# Patient Record
Sex: MALE | Race: BLACK OR AFRICAN AMERICAN | NOT HISPANIC OR LATINO | Employment: OTHER | URBAN - METROPOLITAN AREA
[De-identification: names, ages, dates, MRNs, and addresses within clinical notes are randomized per-mention and may not be internally consistent; named-entity substitution may affect disease eponyms.]

---

## 2017-04-24 ENCOUNTER — OFFICE VISIT (OUTPATIENT)
Dept: OPHTHALMOLOGY | Facility: CLINIC | Age: 77
End: 2017-04-24
Payer: MEDICARE

## 2017-04-24 DIAGNOSIS — H40.043 STEROID RESPONDER, BILATERAL: ICD-10-CM

## 2017-04-24 DIAGNOSIS — H57.03 SMALL PUPIL: ICD-10-CM

## 2017-04-24 DIAGNOSIS — H40.1133 PRIMARY OPEN ANGLE GLAUCOMA OF BOTH EYES, SEVERE STAGE: Primary | ICD-10-CM

## 2017-04-24 DIAGNOSIS — H01.9 EYELID INFLAMMATION: ICD-10-CM

## 2017-04-24 DIAGNOSIS — Z96.1 PSEUDOPHAKIA OF RIGHT EYE: ICD-10-CM

## 2017-04-24 DIAGNOSIS — H25.12 NUCLEAR SCLEROSIS, LEFT: ICD-10-CM

## 2017-04-24 PROCEDURE — 99999 PR PBB SHADOW E&M-EST. PATIENT-LVL III: CPT | Mod: PBBFAC,,, | Performed by: OPHTHALMOLOGY

## 2017-04-24 PROCEDURE — 92012 INTRM OPH EXAM EST PATIENT: CPT | Mod: S$PBB,,, | Performed by: OPHTHALMOLOGY

## 2017-04-24 PROCEDURE — 99213 OFFICE O/P EST LOW 20 MIN: CPT | Mod: PBBFAC | Performed by: OPHTHALMOLOGY

## 2017-04-24 RX ORDER — TIMOLOL MALEATE 5 MG/ML
1 SOLUTION/ DROPS OPHTHALMIC 2 TIMES DAILY
Qty: 5 ML | Refills: 12 | Status: SHIPPED | OUTPATIENT
Start: 2017-04-24 | End: 2018-03-20 | Stop reason: SDUPTHER

## 2017-04-24 RX ORDER — BRINZOLAMIDE 10 MG/ML
1 SUSPENSION/ DROPS OPHTHALMIC 3 TIMES DAILY
Qty: 10 ML | Refills: 12 | Status: SHIPPED | OUTPATIENT
Start: 2017-04-24 | End: 2018-03-20 | Stop reason: SDUPTHER

## 2017-04-24 RX ORDER — LATANOPROST 50 UG/ML
1 SOLUTION/ DROPS OPHTHALMIC NIGHTLY
Qty: 2.5 ML | Refills: 12 | Status: SHIPPED | OUTPATIENT
Start: 2017-04-24 | End: 2018-03-20 | Stop reason: SDUPTHER

## 2017-04-24 NOTE — PROGRESS NOTES
HPI     Excessive Tearing    Additional comments: Pt her for excessive tearing of both eyes           Comments   DLS: 12/20/2016    C/o: pt states his eyes are tearing a lot since last exam.  Pt here for 4   month iop ck     Meds:  Azopt tid ou             Timolol bid ou              Xalatan qhs ou          Last edited by Kate Guillen MA on 4/24/2017  9:33 AM. (History)            Assessment /Plan     For exam results, see Encounter Report.    There are no diagnoses linked to this encounter.  HPI     Excessive Tearing    Additional comments: Pt her for excessive tearing of both eyes           Comments   DLS: 12/20/2016    C/o: pt states his eyes are tearing a lot since last exam.  Pt here for 4   month iop ck     Meds:  Azopt tid ou             Timolol bid ou              Xalatan qhs ou          Last edited by Kate Guillen MA on 4/24/2017  9:33 AM. (History)            Assessment /Plan     For exam results, see Encounter Report.    There are no diagnoses linked to this encounter.  Pt states he has been seeing Dr. Joyce and that the IOP is ok  He also C/O poor vision OD (has a dense NS // but also endstage glaucoma)  He also C/O epiphora os - s/p probing and irrigation by Isiah - but still persisit     1. POAG (primary open-angle glaucoma) -Advanced   Treated for glaucoma elsewhere x many years  Pt referred in by Dr. Jyoce - ?? Needs trab os to get off some gtts  S/p Barveldt ([place]) OD 2011  Prev SLT OD(?)   First HVF  - [facility] 2013   First photos   [facility] - 2013   Treatment / Drops started   Many years ago - elsewhwere           Family history    ?        Glaucoma meds     Timolol ou , latanoprost ou , brimonidine os, dorzolamide os        H/O adverse rxn to glaucoma drops    C/O irritation os with brimonidine and dorzolamide and alphagan P        LASERS    ?        GLAUCOMA SURGERIES    Baerveldt od - still phakic / [place] - Dr. Mahdi Patrick 2/2011         OTHER EYE SURGERIES    Complex phaco/IOL od  3/18/2015         CDR    0.9 w/ inf notch /0.8        Tbase    ??          Tmax    ??            Ttarget    15/15            HVF    1 outside test 6/15/2012 - Dr. Joyce                   2 [facility] test 2013 - 2014 - SALT / IAD od // early SAD os                   2 2015 OD 24-2 stim V dense SAD/IAD // OS 24-2 ss SAD/IAD        Gonio    +3 S/T/N, PAS I od // +3 S/T/N , PAS I os        CCT    512/494        OCT   2 test 2012 to 2016 - RNFL - OD:dec S/I // OS:dec S/I        HRT    4 test 2013 to 2016 - -MR -  Bord inf but unreliable // bord. S/T os /// CDR 0.49 od // 0.69 os        Disc photos    2013, 2016  - OIS    - Ttoday    16/18  - Test done today IOP check        2. 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  Nuclear sclerosis OU - monitor  - check AR/MR/BAT  BVCA 8/2013 - 20/50 and 20/25  BATh 8/2013 - 20/400 and 20/50    5. ? APD od - re-check          Plan  POAG OD >> OS  S/P glaucoma tube OD  - [facility] - Dr. Mahdi Patrick - 2011  Now has insurance again and is seeing Dr. Joyce  Pt has stopped alphagan P - per Dr. Joyce - he was C/O eye irritation and tearing    - If unable to tolerate the gtts or if IOP too high even with gtts   - rec - some sort of glaucoma filtration surgery and possible cataract surgery at same time OD  - if IOP goes up od - consider a 2nd GDD in the IN quadrant     -intolerant to alphagan P    Glaucoma gtts   Timolol ou bid   azopt tid ou  latanoprsot q hs ou    - ?? APD od - difficult to tell - may have a reverse APD   - ?? Ever had SLT or if angle open enough - angles open S/N/I, too narrow inf     Blepharitits -- C/O OF Continued TEARING. OS>OD // this is a chronic - ongoing issue    WC/LH/EES oint and AT's --> reinforced lid hygiene  BID    RE-START ERYTHROMYCIN OINTMENT TO EYE LIDS QHS/PRN --> pt states he is not using this --> educated to re-start     F/u with HVF - 24-2 stim V od and  24-2 ss os // DFE // OCT - 4 months   -if + prog or if IOP continues to creep up re-discuss surgical options   ?? If ever tried teresita or if could tolerate it

## 2017-08-25 ENCOUNTER — OFFICE VISIT (OUTPATIENT)
Dept: OPHTHALMOLOGY | Facility: CLINIC | Age: 77
End: 2017-08-25
Payer: MEDICARE

## 2017-08-25 ENCOUNTER — CLINICAL SUPPORT (OUTPATIENT)
Dept: OPHTHALMOLOGY | Facility: CLINIC | Age: 77
End: 2017-08-25
Payer: MEDICARE

## 2017-08-25 DIAGNOSIS — H25.12 NUCLEAR SCLEROSIS, LEFT: ICD-10-CM

## 2017-08-25 DIAGNOSIS — H40.1133 PRIMARY OPEN ANGLE GLAUCOMA OF BOTH EYES, SEVERE STAGE: ICD-10-CM

## 2017-08-25 DIAGNOSIS — Z96.1 PSEUDOPHAKIA OF RIGHT EYE: ICD-10-CM

## 2017-08-25 DIAGNOSIS — H40.043 STEROID RESPONDER, BILATERAL: ICD-10-CM

## 2017-08-25 DIAGNOSIS — H40.1133 PRIMARY OPEN ANGLE GLAUCOMA OF BOTH EYES, SEVERE STAGE: Primary | ICD-10-CM

## 2017-08-25 DIAGNOSIS — H57.03 SMALL PUPIL: ICD-10-CM

## 2017-08-25 PROCEDURE — 92133 CPTRZD OPH DX IMG PST SGM ON: CPT | Mod: PBBFAC | Performed by: OPHTHALMOLOGY

## 2017-08-25 PROCEDURE — 99212 OFFICE O/P EST SF 10 MIN: CPT | Mod: PBBFAC | Performed by: OPHTHALMOLOGY

## 2017-08-25 PROCEDURE — 92012 INTRM OPH EXAM EST PATIENT: CPT | Mod: S$PBB,,, | Performed by: OPHTHALMOLOGY

## 2017-08-25 PROCEDURE — 99999 PR PBB SHADOW E&M-EST. PATIENT-LVL II: CPT | Mod: PBBFAC,,, | Performed by: OPHTHALMOLOGY

## 2017-08-25 PROCEDURE — 92083 EXTENDED VISUAL FIELD XM: CPT | Mod: 26,S$PBB,, | Performed by: OPHTHALMOLOGY

## 2017-08-25 NOTE — Clinical Note
Complex phaco/IOL / GDD - ? baerveldt OS - trypan blue / Yessica ramirez  Phone - home - [phone] ? Cell - [phone]

## 2017-08-25 NOTE — PROGRESS NOTES
HPI     DLS: 4/24/17    Pt here for HVF review;  Pt states he may need refills on all of his drops. Pt states the OS runs   water a lot. Pt didn't want to be dilated today.     Meds: Azopt tid ou             Timolol bid ou             Xalatan qhs ou    1. POAG (primary open angle glaucoma), advanced  2. Blepharitis   3. Posterior synechiae, right eye  4. Nuclear sclerosis, both eyes      Last edited by Asia Wu on 8/25/2017 10:15 AM. (History)            Assessment /Plan     For exam results, see Encounter Report.    Primary open angle glaucoma of both eyes, severe stage  -     Posterior Segment OCT Optic Nerve- Both eyes    Small pupil    Steroid responder, bilateral    Glaucoma shunt device of right eye    Pseudophakia of right eye    Nuclear sclerosis, left      HPI     Excessive Tearing    Additional comments: Pt her for excessive tearing of both eyes           Comments   DLS: 12/20/2016    C/o: pt states his eyes are tearing a lot since last exam.  Pt here for 4   month iop ck     Meds:  Azopt tid ou             Timolol bid ou              Xalatan qhs ou          Last edited by Kate Guillen MA on 4/24/2017  9:33 AM. (History)            Assessment /Plan     For exam results, see Encounter Report.    There are no diagnoses linked to this encounter.  HPI     Excessive Tearing    Additional comments: Pt her for excessive tearing of both eyes           Comments   DLS: 12/20/2016    C/o: pt states his eyes are tearing a lot since last exam.  Pt here for 4   month iop ck     Meds:  Azopt tid ou             Timolol bid ou              Xalatan qhs ou          Last edited by Kate Guillen MA on 4/24/2017  9:33 AM. (History)            Assessment /Plan     For exam results, see Encounter Report.    There are no diagnoses linked to this encounter.  Pt states he has been seeing Dr. Joyce and that the IOP is ok  He also C/O poor vision OD (has a dense NS // but also endstage glaucoma)  He also C/O epiphora os - s/p  probing and irrigation by [facility]iah - but still persisit     1. POAG (primary open-angle glaucoma) -Advanced   Treated for glaucoma elsewhere x many years  Pt referred in by Dr. Joyce - ?? Needs trab os to get off some gtts  S/p Barveldt ([place]) OD 2011  Prev SLT OD(?)   [facility]   [facility]   [facility] - [facility]   Treatment / Drops started   Many years ago - elsewhwere           Family history    ?        Glaucoma meds     Timolol ou , latanoprost ou , brimonidine os, dorzolamide os        H/O adverse rxn to glaucoma drops    C/O irritation os with brimonidine and dorzolamide and alphagan P        LASERS    ?        GLAUCOMA SURGERIES    Baerveldt od - still phakic / [place] - Dr. Mahdi Patrick 2/2011         OTHER EYE SURGERIES    Complex phaco/IOL od 3/18/2015         CDR    0.9 w/ inf notch /0.8        Tbase    ??          Tmax    ??            Ttarget    15/15            HVF    1 outside test 6/15/2012 - Dr. Joyce                   2 [facility] test 2013 - 2014 - SALT / IAD od // early SAD os                   3 2017 OD 24-2 stim V dense SALT/IAD // OS 24-2 ss SAD/IAD        Gonio    +3 S/T/N, PAS I od // +3 S/T/N , PAS I os        CCT    512/494        OCT   3 test 2012 to 2017 - RNFL - OD:dec S/I/N // OS:dec S/I/N        HRT    4 test 2013 to 2016 - -MR -  Bord inf but unreliable // bord. S/T os /// CDR 0.49 od // 0.69 os        Disc photos    2013, 2016  - OIS    - Ttoday    16/18  - Test done today HVF / OCT       2. 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  Nuclear sclerosis OU - monitor  - check AR/MR/BAT  BVCA 8/2013 - 20/50 and 20/25  BATh 8/2013 - 20/400 and 20/50    5. ? APD od - re-check          Plan  POAG OD >> OS  S/P glaucoma tube OD  - baerveldt - [place] - Dr. Mahdi Patrick - 2011  Now has insurance again and is seeing Dr. Joyce  Pt has stopped alphagan P - per Dr. Joyce - he was C/O eye  irritation and tearing    - If unable to tolerate the gtts or if IOP too high even with gtts   - rec - some sort of glaucoma filtration surgery and possible cataract surgery at same time OS  -IOP borderline - and ++ VF progression OS      - if IOP goes up od - consider a 2nd GDD in the IN quadrant      -intolerant to alphagan P    Glaucoma gtts   Timolol ou bid   azopt tid ou  latanoprsot q hs ou    - ?? APD od - difficult to tell - may have a reverse APD   - too narrow for SLT os - may open post phacol     Blepharitits -- C/O OF Continued TEARING. OS>OD // this is a chronic - ongoing issue    WC/LH/EES oint and AT's --> reinforced lid hygiene  BID    RE-START ERYTHROMYCIN OINTMENT TO EYE LIDS QHS/PRN --> pt states he is not using this --> educated to re-start     PLAN  ++ VF prog os   NS worsening - + cortical changes   rec complex - phaco/IOL // + trypan blue and ? Yessica ring - had IFIS in other eye // and GDD - ?? ahmed vs Baerveldt - may do better long term with Baerveldt

## 2017-08-29 ENCOUNTER — TELEPHONE (OUTPATIENT)
Dept: OPHTHALMOLOGY | Facility: CLINIC | Age: 77
End: 2017-08-29

## 2017-09-14 ENCOUNTER — TELEPHONE (OUTPATIENT)
Dept: OPHTHALMOLOGY | Facility: CLINIC | Age: 77
End: 2017-09-14

## 2017-09-14 DIAGNOSIS — H40.1133 PRIMARY OPEN ANGLE GLAUCOMA OF BOTH EYES, SEVERE STAGE: ICD-10-CM

## 2017-09-14 DIAGNOSIS — H25.12 NUCLEAR SCLEROTIC CATARACT OF LEFT EYE: Primary | ICD-10-CM

## 2017-09-29 ENCOUNTER — OFFICE VISIT (OUTPATIENT)
Dept: OPHTHALMOLOGY | Facility: CLINIC | Age: 77
End: 2017-09-29
Payer: MEDICARE

## 2017-09-29 DIAGNOSIS — Z96.1 PSEUDOPHAKIA OF RIGHT EYE: ICD-10-CM

## 2017-09-29 DIAGNOSIS — H57.03 SMALL PUPIL: ICD-10-CM

## 2017-09-29 DIAGNOSIS — H25.12 NUCLEAR SCLEROTIC CATARACT OF LEFT EYE: Primary | ICD-10-CM

## 2017-09-29 DIAGNOSIS — H40.1123 PRIMARY OPEN ANGLE GLAUCOMA OF LEFT EYE, SEVERE STAGE: ICD-10-CM

## 2017-09-29 DIAGNOSIS — H40.1133 PRIMARY OPEN ANGLE GLAUCOMA OF BOTH EYES, SEVERE STAGE: ICD-10-CM

## 2017-09-29 DIAGNOSIS — H40.043 STEROID RESPONDER, BILATERAL: ICD-10-CM

## 2017-09-29 PROCEDURE — 99212 OFFICE O/P EST SF 10 MIN: CPT | Mod: PBBFAC | Performed by: OPHTHALMOLOGY

## 2017-09-29 PROCEDURE — 92136 OPHTHALMIC BIOMETRY: CPT | Mod: PBBFAC,LT | Performed by: OPHTHALMOLOGY

## 2017-09-29 PROCEDURE — 99999 PR PBB SHADOW E&M-EST. PATIENT-LVL II: CPT | Mod: PBBFAC,,, | Performed by: OPHTHALMOLOGY

## 2017-09-29 PROCEDURE — 99499 UNLISTED E&M SERVICE: CPT | Mod: S$PBB,,, | Performed by: OPHTHALMOLOGY

## 2017-09-29 RX ORDER — TROPICAMIDE 10 MG/ML
1 SOLUTION/ DROPS OPHTHALMIC
Status: CANCELLED | OUTPATIENT
Start: 2017-09-29

## 2017-09-29 RX ORDER — SODIUM CHLORIDE 9 MG/ML
INJECTION, SOLUTION INTRAVENOUS CONTINUOUS
Status: CANCELLED | OUTPATIENT
Start: 2017-09-29

## 2017-09-29 RX ORDER — MOXIFLOXACIN 5 MG/ML
1 SOLUTION/ DROPS OPHTHALMIC
Status: CANCELLED | OUTPATIENT
Start: 2017-09-29

## 2017-09-29 RX ORDER — LIDOCAINE HYDROCHLORIDE 10 MG/ML
1 INJECTION, SOLUTION EPIDURAL; INFILTRATION; INTRACAUDAL; PERINEURAL ONCE
Status: CANCELLED | OUTPATIENT
Start: 2017-09-29 | End: 2017-09-29

## 2017-09-29 RX ORDER — PHENYLEPHRINE HYDROCHLORIDE 100 MG/ML
1 SOLUTION/ DROPS OPHTHALMIC
Status: CANCELLED | OUTPATIENT
Start: 2017-09-29

## 2017-09-29 RX ORDER — KETOROLAC TROMETHAMINE 5 MG/ML
1 SOLUTION OPHTHALMIC
Status: CANCELLED | OUTPATIENT
Start: 2017-09-29

## 2017-09-29 NOTE — H&P
Subjective:       Patient ID: Ulysses R Jones Jr. is a 77 y.o. male.    Chief Complaint: Pre-op Exam    HPI  Review of Systems   Constitutional: Negative.    HENT: Negative.    Eyes: Positive for visual disturbance.   Respiratory: Negative.    Cardiovascular: Negative.    Gastrointestinal: Negative.    Endocrine: Negative.    Genitourinary: Negative.        Objective:      Physical Exam   Constitutional: He is oriented to person, place, and time. He appears well-nourished.   HENT:   Head: Normocephalic and atraumatic.   Cardiovascular: Normal rate.    Pulmonary/Chest: Effort normal.   Neurological: He is alert and oriented to person, place, and time.   Skin: Skin is warm and dry.   Psychiatric: He has a normal mood and affect.       Assessment:       1. Nuclear sclerotic cataract of left eye    2. Primary open angle glaucoma of both eyes, severe stage    3. Small pupil    4. Steroid responder, bilateral    5. Glaucoma shunt device of right eye    6. Pseudophakia of right eye        Plan:       Plan for complex phaco/Trypan/Malyugin ring with combined Baerveldt in the left eye 10/11/2017

## 2017-09-29 NOTE — PROGRESS NOTES
HPI     DLS: 8/25/17    Pt here for Pre-Op phaco w/IOL/GDD- OS (Surgery is 10/11/17)    Meds: Azopt tid ou             Timolol bid ou              Xalatan qhs ou    1. Primary open angle glaucoma of both eyes, severe stage  2. Small pupil  3. Steroid responder, bilateral  4. Glaucoma shunt device of right eye  5. Pseudophakia, right eye  6. Nuclear sclerosis, left eye     Last edited by Asia Wu on 9/29/2017  1:11 PM. (History)            Assessment /Plan     For exam results, see Encounter Report.    Nuclear sclerotic cataract of left eye  -     IOL Master - MOD 26 - OS - Left eye  -     Place in Outpatient; Standing  -     Vital signs, per unit routine ; Standing  -     Activity as tolerated; Standing  -     Void on arrival ; Standing  -     Diet NPO; Standing    Primary open angle glaucoma of left eye, severe stage    Small pupil    Primary open angle glaucoma of both eyes, severe stage  -     Place in Outpatient; Standing  -     Vital signs, per unit routine ; Standing  -     Activity as tolerated; Standing  -     Void on arrival ; Standing  -     Diet NPO; Standing    Steroid responder, bilateral    Glaucoma shunt device of right eye    Pseudophakia of right eye    Other orders  -     lidocaine (PF) 10 mg/ml (1%) injection 10 mg; Inject 1 mL (10 mg total) into the skin once.  -     0.9%  NaCl infusion; Inject into the vein continuous.  -     ketorolac 0.5% ophthalmic solution 1 drop; Place 1 drop into the left eye On call Procedure (surgery).  -     moxifloxacin 0.5 % ophthalmic solution 1 drop; Place 1 drop into the left eye On call Procedure (surgery).  -     phenylephrine HCL 10% ophthalmic solution 1 drop; Place 1 drop into the left eye On call Procedure for Irritation (surgery).  -     tropicamide 1% ophthalmic solution 1 drop; Place 1 drop into the left eye On call Procedure (surgery).      1. Nuclear sclerotic cataract of left eye    2. Primary open angle glaucoma of left eye, severe stage    3.  Small pupil    4. Primary open angle glaucoma of both eyes, severe stage    5. Steroid responder, bilateral    6. Glaucoma shunt device of right eye    7. Pseudophakia of right eye          Old pt of  Dr. Joyce ok  C/O epiphora os - s/p probing and irrigation by [facility] - but still persisit     1. POAG (primary open-angle glaucoma) -Advanced   Treated for glaucoma elsewhere x many years  Pt referred in by Dr. Joyce - ?? Needs trab os to get off some gtts  S/p Barveldt ([place]) OD 2011  Prev SLT OD(?)   [facility]   [facility]   [facility] - [facility]   Treatment / Drops started   Many years ago - elsewhwere           Family history    ?        Glaucoma meds     Timolol ou , latanoprost ou , brimonidine os, dorzolamide os        H/O adverse rxn to glaucoma drops    C/O irritation os with brimonidine and dorzolamide and alphagan P        LASERS    ?        GLAUCOMA SURGERIES    Baerveldt od - still phakic / [place] - Dr. Mahdi Patrick 2/2011         OTHER EYE SURGERIES    Complex phaco/IOL od 3/18/2015         CDR    0.9 w/ inf notch /0.8        Tbase    ??          Tmax    ??            Ttarget    15/15            HVF    1 outside test 6/15/2012 - Dr. Joyce                   2 [facility] test 2013 - 2014 - SALT / IAD od // early SAD os                   3 2017 OD 24-2 stim V dense SALT/IAD // OS 24-2 ss SAD/IAD        Gonio    +3 S/T/N, PAS I od // +3 S/T/N , PAS I os        CCT    512/494        OCT   3 test 2012 to 2017 - RNFL - OD:dec S/I/N // OS:dec S/I/N        HRT    4 test 2013 to 2016 - -MR -  Bord inf but unreliable // bord. S/T os /// CDR 0.49 od // 0.69 os        Disc photos    2013, 2016  - OIS    - Ttoday    16/18  - Test done today pre-op phaco / IOL / baerveldt OS - small pupil - needs general anesthesia       2. 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  Nuclear sclerosis OU - monitor  - check AR/MR/BAT  BVCA  8/2013 - 20/50 and 20/25  BATh 8/2013 - 20/400 and 20/50    5. ? APD od - re-check          Plan  POAG OD >> OS  S/P glaucoma tube OD  - baerveldt - [place] - Dr. Mahdi Patrick - 2011  Now has insurance again and is seeing Dr. Joyce  Pt has stopped alphagan P - per Dr. Joyce - he was C/O eye irritation and tearing    Pt C/O blurry vision os   rec phaco/IOL os with small pupil // Yessica ring // and Baerveldt   + will need general again   -IOP borderline - and ++ VF progression OS      - if IOP goes up OD  - consider a 2nd GDD in the IN quadrant      -intolerant to alphagan P    Glaucoma gtts   Timolol ou bid   azopt tid ou  latanoprost q hs ou    - ?? APD od - difficult to tell - may have a reverse APD   - too narrow for SLT os - may open post phacol     Blepharitits -- C/O OF Continued TEARING. OS>OD // this is a chronic - ongoing issue    WC/LH/EES oint and AT's --> reinforced lid hygiene  BID    RE-START ERYTHROMYCIN OINTMENT TO EYE LIDS QHS/PRN --> pt states he is not using this --> educated to re-start       rec complex - phaco/IOL // + trypan blue and ? Yessica ring - had IFIS in other eye // and GDD - ?? ahmed vs Baerveldt - may do better long term with Baerveldt - GENERAL ANESTHESIA AGAIN -  10/11/17    IOL CALC OS  SN60WF 21.50 D  ACIOL 18.0 D    Risks and benefits discussed and consent signed.

## 2017-10-11 ENCOUNTER — ANESTHESIA EVENT (OUTPATIENT)
Dept: SURGERY | Facility: HOSPITAL | Age: 77
End: 2017-10-11
Payer: MEDICARE

## 2017-10-11 ENCOUNTER — HOSPITAL ENCOUNTER (OUTPATIENT)
Facility: HOSPITAL | Age: 77
Discharge: HOME | End: 2017-10-11
Attending: OPHTHALMOLOGY | Admitting: OPHTHALMOLOGY
Payer: MEDICARE

## 2017-10-11 ENCOUNTER — ANESTHESIA (OUTPATIENT)
Dept: SURGERY | Facility: HOSPITAL | Age: 77
End: 2017-10-11
Payer: MEDICARE

## 2017-10-11 VITALS
RESPIRATION RATE: 16 BRPM | WEIGHT: 160 LBS | HEART RATE: 67 BPM | DIASTOLIC BLOOD PRESSURE: 72 MMHG | BODY MASS INDEX: 25.71 KG/M2 | TEMPERATURE: 98 F | SYSTOLIC BLOOD PRESSURE: 122 MMHG | OXYGEN SATURATION: 100 % | HEIGHT: 66 IN

## 2017-10-11 DIAGNOSIS — H25.12 NUCLEAR SCLEROTIC CATARACT OF LEFT EYE: Primary | ICD-10-CM

## 2017-10-11 DIAGNOSIS — H40.1133 PRIMARY OPEN ANGLE GLAUCOMA OF BOTH EYES, SEVERE STAGE: ICD-10-CM

## 2017-10-11 LAB — POCT GLUCOSE: 115 MG/DL (ref 70–110)

## 2017-10-11 PROCEDURE — 63600175 PHARM REV CODE 636 W HCPCS: Performed by: OPHTHALMOLOGY

## 2017-10-11 PROCEDURE — 25000003 PHARM REV CODE 250: Performed by: NURSE ANESTHETIST, CERTIFIED REGISTERED

## 2017-10-11 PROCEDURE — D9220A PRA ANESTHESIA: Mod: ANES,,, | Performed by: ANESTHESIOLOGY

## 2017-10-11 PROCEDURE — C9447 INJ, PHENYLEPHRINE KETOROLAC: HCPCS | Performed by: OPHTHALMOLOGY

## 2017-10-11 PROCEDURE — D9220A PRA ANESTHESIA: Mod: CRNA,,, | Performed by: NURSE ANESTHETIST, CERTIFIED REGISTERED

## 2017-10-11 PROCEDURE — V2632 POST CHMBR INTRAOCULAR LENS: HCPCS | Performed by: OPHTHALMOLOGY

## 2017-10-11 PROCEDURE — 36000706: Performed by: OPHTHALMOLOGY

## 2017-10-11 PROCEDURE — 27800903 OPTIME MED/SURG SUP & DEVICES OTHER IMPLANTS: Performed by: OPHTHALMOLOGY

## 2017-10-11 PROCEDURE — 37000008 HC ANESTHESIA 1ST 15 MINUTES: Performed by: OPHTHALMOLOGY

## 2017-10-11 PROCEDURE — 25000003 PHARM REV CODE 250: Performed by: OPHTHALMOLOGY

## 2017-10-11 PROCEDURE — 71000015 HC POSTOP RECOV 1ST HR: Performed by: OPHTHALMOLOGY

## 2017-10-11 PROCEDURE — 82962 GLUCOSE BLOOD TEST: CPT | Performed by: OPHTHALMOLOGY

## 2017-10-11 PROCEDURE — 27200651 HC AIRWAY, LMA: Performed by: NURSE ANESTHETIST, CERTIFIED REGISTERED

## 2017-10-11 PROCEDURE — 66984 XCAPSL CTRC RMVL W/O ECP: CPT | Mod: 51,LT,, | Performed by: OPHTHALMOLOGY

## 2017-10-11 PROCEDURE — 36000707: Performed by: OPHTHALMOLOGY

## 2017-10-11 PROCEDURE — 71000033 HC RECOVERY, INTIAL HOUR: Performed by: OPHTHALMOLOGY

## 2017-10-11 PROCEDURE — 66183 INSERT ANT DRAINAGE DEVICE: CPT | Mod: LT,,, | Performed by: OPHTHALMOLOGY

## 2017-10-11 PROCEDURE — 63600175 PHARM REV CODE 636 W HCPCS: Performed by: NURSE ANESTHETIST, CERTIFIED REGISTERED

## 2017-10-11 PROCEDURE — 71000016 HC POSTOP RECOV ADDL HR: Performed by: OPHTHALMOLOGY

## 2017-10-11 PROCEDURE — C1783 OCULAR IMP, AQUEOUS DRAIN DE: HCPCS | Performed by: OPHTHALMOLOGY

## 2017-10-11 PROCEDURE — 37000009 HC ANESTHESIA EA ADD 15 MINS: Performed by: OPHTHALMOLOGY

## 2017-10-11 DEVICE — LENS 21.5 ACRYSOF: Type: IMPLANTABLE DEVICE | Site: EYE | Status: FUNCTIONAL

## 2017-10-11 DEVICE — PERICARDIUM TUTOPLAST 1.5X1.5: Type: IMPLANTABLE DEVICE | Site: EYE | Status: FUNCTIONAL

## 2017-10-11 DEVICE — IMPLANT BEARVELDT GLAUCOMA 250: Type: IMPLANTABLE DEVICE | Site: EYE | Status: FUNCTIONAL

## 2017-10-11 RX ORDER — ACETAZOLAMIDE 500 MG/1
500 CAPSULE, EXTENDED RELEASE ORAL ONCE
Status: COMPLETED | OUTPATIENT
Start: 2017-10-11 | End: 2017-10-11

## 2017-10-11 RX ORDER — LIDOCAINE HYDROCHLORIDE 20 MG/ML
JELLY TOPICAL
Status: DISCONTINUED
Start: 2017-10-11 | End: 2017-10-11 | Stop reason: WASHOUT

## 2017-10-11 RX ORDER — ATROPINE SULFATE 10 MG/ML
SOLUTION/ DROPS OPHTHALMIC
Status: DISCONTINUED
Start: 2017-10-11 | End: 2017-10-11 | Stop reason: HOSPADM

## 2017-10-11 RX ORDER — FENTANYL CITRATE 50 UG/ML
INJECTION, SOLUTION INTRAMUSCULAR; INTRAVENOUS
Status: DISCONTINUED | OUTPATIENT
Start: 2017-10-11 | End: 2017-10-11

## 2017-10-11 RX ORDER — LIDOCAINE HYDROCHLORIDE 40 MG/ML
INJECTION, SOLUTION RETROBULBAR
Status: DISCONTINUED | OUTPATIENT
Start: 2017-10-11 | End: 2017-10-11 | Stop reason: HOSPADM

## 2017-10-11 RX ORDER — HYDROMORPHONE HYDROCHLORIDE 1 MG/ML
0.2 INJECTION, SOLUTION INTRAMUSCULAR; INTRAVENOUS; SUBCUTANEOUS EVERY 5 MIN PRN
Status: DISCONTINUED | OUTPATIENT
Start: 2017-10-11 | End: 2017-10-11 | Stop reason: HOSPADM

## 2017-10-11 RX ORDER — PREDNISOLONE ACETATE 10 MG/ML
SUSPENSION/ DROPS OPHTHALMIC
Status: DISCONTINUED | OUTPATIENT
Start: 2017-10-11 | End: 2017-10-11 | Stop reason: HOSPADM

## 2017-10-11 RX ORDER — ACETAMINOPHEN 325 MG/1
650 TABLET ORAL EVERY 6 HOURS PRN
Status: DISCONTINUED | OUTPATIENT
Start: 2017-10-11 | End: 2017-10-11 | Stop reason: HOSPADM

## 2017-10-11 RX ORDER — LIDOCAINE HYDROCHLORIDE 10 MG/ML
INJECTION, SOLUTION EPIDURAL; INFILTRATION; INTRACAUDAL; PERINEURAL
Status: DISCONTINUED | OUTPATIENT
Start: 2017-10-11 | End: 2017-10-11 | Stop reason: HOSPADM

## 2017-10-11 RX ORDER — PROPOFOL 10 MG/ML
INJECTION, EMULSION INTRAVENOUS
Status: DISCONTINUED | OUTPATIENT
Start: 2017-10-11 | End: 2017-10-11

## 2017-10-11 RX ORDER — ACETAZOLAMIDE 500 MG/1
CAPSULE, EXTENDED RELEASE ORAL
Status: DISCONTINUED
Start: 2017-10-11 | End: 2017-10-11 | Stop reason: HOSPADM

## 2017-10-11 RX ORDER — TROPICAMIDE 10 MG/ML
1 SOLUTION/ DROPS OPHTHALMIC
Status: DISCONTINUED | OUTPATIENT
Start: 2017-10-11 | End: 2017-10-11 | Stop reason: HOSPADM

## 2017-10-11 RX ORDER — PHENYLEPHRINE HYDROCHLORIDE 100 MG/ML
1 SOLUTION/ DROPS OPHTHALMIC
Status: DISCONTINUED | OUTPATIENT
Start: 2017-10-11 | End: 2017-10-11 | Stop reason: HOSPADM

## 2017-10-11 RX ORDER — MOXIFLOXACIN 5 MG/ML
SOLUTION/ DROPS OPHTHALMIC
Status: DISCONTINUED | OUTPATIENT
Start: 2017-10-11 | End: 2017-10-11 | Stop reason: HOSPADM

## 2017-10-11 RX ORDER — LIDOCAINE HYDROCHLORIDE 10 MG/ML
INJECTION, SOLUTION EPIDURAL; INFILTRATION; INTRACAUDAL; PERINEURAL
Status: DISCONTINUED
Start: 2017-10-11 | End: 2017-10-11 | Stop reason: HOSPADM

## 2017-10-11 RX ORDER — MOXIFLOXACIN 5 MG/ML
1 SOLUTION/ DROPS OPHTHALMIC
Status: DISCONTINUED | OUTPATIENT
Start: 2017-10-11 | End: 2017-10-11 | Stop reason: HOSPADM

## 2017-10-11 RX ORDER — PHENYLEPHRINE HYDROCHLORIDE 10 MG/ML
INJECTION INTRAVENOUS
Status: DISCONTINUED | OUTPATIENT
Start: 2017-10-11 | End: 2017-10-11

## 2017-10-11 RX ORDER — GENTAMICIN SULFATE 40 MG/ML
INJECTION, SOLUTION INTRAMUSCULAR; INTRAVENOUS
Status: DISCONTINUED
Start: 2017-10-11 | End: 2017-10-11 | Stop reason: HOSPADM

## 2017-10-11 RX ORDER — SODIUM CHLORIDE 9 MG/ML
INJECTION, SOLUTION INTRAVENOUS CONTINUOUS
Status: DISCONTINUED | OUTPATIENT
Start: 2017-10-11 | End: 2017-10-11 | Stop reason: HOSPADM

## 2017-10-11 RX ORDER — LIDOCAINE HYDROCHLORIDE 40 MG/ML
INJECTION, SOLUTION RETROBULBAR
Status: DISCONTINUED
Start: 2017-10-11 | End: 2017-10-11 | Stop reason: HOSPADM

## 2017-10-11 RX ORDER — LIDOCAINE HYDROCHLORIDE 10 MG/ML
1 INJECTION, SOLUTION EPIDURAL; INFILTRATION; INTRACAUDAL; PERINEURAL ONCE
Status: COMPLETED | OUTPATIENT
Start: 2017-10-11 | End: 2017-10-11

## 2017-10-11 RX ORDER — PREDNISOLONE ACETATE 10 MG/ML
SUSPENSION/ DROPS OPHTHALMIC
Status: DISCONTINUED
Start: 2017-10-11 | End: 2017-10-11 | Stop reason: HOSPADM

## 2017-10-11 RX ORDER — LIDOCAINE HCL/PF 100 MG/5ML
SYRINGE (ML) INTRAVENOUS
Status: DISCONTINUED | OUTPATIENT
Start: 2017-10-11 | End: 2017-10-11

## 2017-10-11 RX ORDER — ONDANSETRON 2 MG/ML
INJECTION INTRAMUSCULAR; INTRAVENOUS
Status: DISCONTINUED | OUTPATIENT
Start: 2017-10-11 | End: 2017-10-11

## 2017-10-11 RX ORDER — KETOROLAC TROMETHAMINE 5 MG/ML
1 SOLUTION OPHTHALMIC
Status: DISCONTINUED | OUTPATIENT
Start: 2017-10-11 | End: 2017-10-11 | Stop reason: HOSPADM

## 2017-10-11 RX ORDER — MIDAZOLAM HYDROCHLORIDE 1 MG/ML
INJECTION, SOLUTION INTRAMUSCULAR; INTRAVENOUS
Status: DISCONTINUED | OUTPATIENT
Start: 2017-10-11 | End: 2017-10-11

## 2017-10-11 RX ORDER — ACETAMINOPHEN 500 MG
1000 TABLET ORAL ONCE
Status: DISCONTINUED | OUTPATIENT
Start: 2017-10-11 | End: 2017-10-11 | Stop reason: HOSPADM

## 2017-10-11 RX ORDER — SODIUM CHLORIDE 0.9 % (FLUSH) 0.9 %
3 SYRINGE (ML) INJECTION
Status: DISCONTINUED | OUTPATIENT
Start: 2017-10-11 | End: 2017-10-11 | Stop reason: HOSPADM

## 2017-10-11 RX ADMIN — EPHEDRINE SULFATE 10 MG: 50 INJECTION, SOLUTION INTRAMUSCULAR; INTRAVENOUS; SUBCUTANEOUS at 11:10

## 2017-10-11 RX ADMIN — MIDAZOLAM HYDROCHLORIDE 2 MG: 1 INJECTION, SOLUTION INTRAMUSCULAR; INTRAVENOUS at 11:10

## 2017-10-11 RX ADMIN — KETOROLAC TROMETHAMINE 1 DROP: 5 SOLUTION/ DROPS OPHTHALMIC at 09:10

## 2017-10-11 RX ADMIN — EPHEDRINE SULFATE 10 MG: 50 INJECTION, SOLUTION INTRAMUSCULAR; INTRAVENOUS; SUBCUTANEOUS at 12:10

## 2017-10-11 RX ADMIN — ACETAMINOPHEN 650 MG: 325 TABLET ORAL at 03:10

## 2017-10-11 RX ADMIN — TROPICAMIDE 1 DROP: 10 SOLUTION/ DROPS OPHTHALMIC at 09:10

## 2017-10-11 RX ADMIN — FENTANYL CITRATE 50 MCG: 50 INJECTION, SOLUTION INTRAMUSCULAR; INTRAVENOUS at 01:10

## 2017-10-11 RX ADMIN — FENTANYL CITRATE 25 MCG: 50 INJECTION, SOLUTION INTRAMUSCULAR; INTRAVENOUS at 12:10

## 2017-10-11 RX ADMIN — ONDANSETRON 4 MG: 2 INJECTION INTRAMUSCULAR; INTRAVENOUS at 12:10

## 2017-10-11 RX ADMIN — MOXIFLOXACIN HYDROCHLORIDE 1 DROP: 5 SOLUTION/ DROPS OPHTHALMIC at 09:10

## 2017-10-11 RX ADMIN — SODIUM CHLORIDE: 0.9 INJECTION, SOLUTION INTRAVENOUS at 09:10

## 2017-10-11 RX ADMIN — PHENYLEPHRINE HYDROCHLORIDE 1 DROP: 100 SOLUTION/ DROPS OPHTHALMIC at 09:10

## 2017-10-11 RX ADMIN — ACETAZOLAMIDE 500 MG: 500 CAPSULE, EXTENDED RELEASE ORAL at 02:10

## 2017-10-11 RX ADMIN — LIDOCAINE HYDROCHLORIDE 1 MG: 10 INJECTION, SOLUTION EPIDURAL; INFILTRATION; INTRACAUDAL; PERINEURAL at 09:10

## 2017-10-11 RX ADMIN — FENTANYL CITRATE 25 MCG: 50 INJECTION, SOLUTION INTRAMUSCULAR; INTRAVENOUS at 11:10

## 2017-10-11 RX ADMIN — PHENYLEPHRINE HYDROCHLORIDE 100 MCG: 10 INJECTION INTRAVENOUS at 11:10

## 2017-10-11 RX ADMIN — PROPOFOL 150 MG: 10 INJECTION, EMULSION INTRAVENOUS at 11:10

## 2017-10-11 RX ADMIN — PHENYLEPHRINE HYDROCHLORIDE 200 MCG: 10 INJECTION INTRAVENOUS at 11:10

## 2017-10-11 RX ADMIN — LIDOCAINE HYDROCHLORIDE 100 MG: 20 INJECTION, SOLUTION INTRAVENOUS at 11:10

## 2017-10-11 NOTE — ANESTHESIA PREPROCEDURE EVALUATION
10/11/2017  Ulysses R Jones Jr. is a 77 y.o., male.    Pre-op Assessment    I have reviewed the Patient Summary Reports.     I have reviewed the Nursing Notes.      Review of Systems  Anesthesia Hx:  No problems with previous Anesthesia    Social:  Non-Smoker, No Alcohol Use    Cardiovascular:   Exercise tolerance: good Hypertension Denies MI.  Denies CAD.    Denies CABG/stent.  Denies Dysrhythmias.   Denies Angina.        Pulmonary:   Denies COPD.  Denies Asthma. Recent URI  Denies Sleep Apnea.    Renal/:   Denies Chronic Renal Disease.     Hepatic/GI:   Denies GERD.    Musculoskeletal:   Arthritis     Neurological:   Denies TIA. Denies CVA. Denies Seizures.    Endocrine:   Denies Diabetes. Denies Hypothyroidism. Denies Hyperthyroidism.        Physical Exam  General:  Well nourished    Airway/Jaw/Neck:  Airway Findings: Mouth Opening: Normal Tongue: Normal  General Airway Assessment: Adult  Mallampati: II  Improves to II with phonation.  TM Distance: 4 - 6 cm     Eyes/Ears/Nose:  Eyes/Ears/Nose Findings:    Dental:  Dental Findings: In tact   Chest/Lungs:  Chest/Lungs Findings: Clear to auscultation     Heart/Vascular:  Heart Findings: Rate: Normal        Mental Status:  Mental Status Findings:  Cooperative, Alert and Oriented         Anesthesia Plan  Type of Anesthesia, risks & benefits discussed:  Anesthesia Type:  general  Patient's Preference: mac  Intra-op Monitoring Plan: standard ASA monitors  Intra-op Monitoring Plan Comments:   Post Op Pain Control Plan: multimodal analgesia, IV/PO Opiods PRN and per primary service following discharge from PACU  Post Op Pain Control Plan Comments:   Induction:   IV  Beta Blocker:  Patient is not currently on a Beta-Blocker (No further documentation required).       Informed Consent: Patient understands risks and agrees with Anesthesia plan.  Questions answered.  Anesthesia consent signed with patient.  ASA Score: 2     Day of Surgery Review of History & Physical:    H&P update referred to the surgeon.     Anesthesia Plan Notes: The patient's PMH was reviewed and PE was performed  Plan for GA        Ready For Surgery From Anesthesia Perspective.     Mr. Ma states that he has had a productive cough for last few days that has improved with antibiotics.  Spoke to surgeon. Plan for GA.

## 2017-10-11 NOTE — OP NOTE
DATE OF PROCEDURE: 10/11/2017    PREOPERATIVE DIAGNOSIS: 1. Cataract// Nuclear sclerotic cataract of left eye OS.        2. Primary open angle Glaucoma - left eye severe    POSTOPERATIVE DIAGNOSIS: Cataract// Nuclear sclerotic cataract of left eyeOS, status post procedure.       2. Primary open angle Glaucoma - left eye severe     PROCEDURE PERFORMED: Cataract extraction with phacoemulsification, posterior   chamber intraocular lens placement.      2. Baerveldt glaucoma implant  - left eye     SURGEON: Promise Franklin M.D.     ASSISTANT: Shawn nation MD      COMPLICATIONS: None.     BLOOD LOSS: Less than 5 mL.     ANESTHESIA: General    IMPLANT DATA:   1. Pietro model SN60WF, power 21.5  diopters, serial #[id number]    PROCEDURE IN DETAIL: After informed consent including risks, benefits,   alternatives, the patient was brought to the operating room table, placed in   supine position. General  anesthesia care was used throughout the entire   procedure. Once adequate anesthesia was confirmed, the patient was then prepped   and draped in usual sterile fashion for intraocular surgery. Wire speculum was   used to hold the eyelids apart and the procedure was initiated by making   a partial thickness corneal wound with a kendy blade temporally.   A supersharp blade was then used to make a second entry into the eye via a paracentesis incision.  Intracameral lidocaine followed by Viscoat were placed in the anterior chamber.   A 2.4 mm blade was then used to make to complete the clear corneal   incision temporally. A cystotome was used to make a tear in   the anterior capsular flap, which was continued around with Utrata forceps for   continuous curvilinear capsulorrhexis. BSS on a Gallagher cannula was then used for   hydrodissection and hydrodelineation of lens. The lens was noted to spin   freely in the bag. Phacoemulsification handpiece was then used to remove the   lens in a divide-and-conquer manner. Irrigation  aspiration handpiece removed   the remaining cortical material. Provisc was placed in the eye followed by the   lens as mentioned above without any complications. Once the lens was in proper   position, irrigation aspiration handpiece was used to remove the remaining Provisc. The   wounds were then hydrated with BSS and noted to be watertight.A 10-0 nylon was placed in the corneal incision.    Next the microscope was re-positioned and the surgeon moved to the head of the bed.   The glaucoma drainage device surgery was initiated .  A baerveldt implant - -177 was used    A superotemporal fornix-based conjunctival peritomy was performed with Vancasper and Pam scissor dissection.  The  implant was inspected and the stent was tied off in a watertight fashion using a  7-0 vicryl suture and tested with 30-gauge cannula on a BSS syringe and demonstrated to be watertight.  A sub-Tenons pocket was formed, the rectus muscles were identified on successive throws with muscle hooks and the shunt implant was then placed in the sug-Tenons space without difficulty.  The implant was fixed to the globe 9 to 10 mm posterior to the limbus using 7-0 Vicryl sutures.  The tubing was then cut to size and the eye was first entered at the paracentesis site and then the eye was reentered to form a stent tract site using a 23-gauge butterfly needle.  The shunt tubing  was placed in the anterior chamber in good position through this tract without difficulty.  The silicone stent was fixated to the globe using interrupted Vicryl suture and a pericardial patch graft was cut to shape, fitting  well over the entrance site and tubing length and fixed to the globe with 8-0 Vicryl suture.  The conjunctiva was then secured to the limbus in a watertight fashion using 8-0 Vicryl sutures.  The anterior chamber was well formed throughout the case and there were no complications.  Following the procedure, a collagen shield soaked in vigamox and  prednisone 1% along with a drop homatropine 5% was placed on the cornea.  The eye was closed, patched, and a Marie shield was placed.  The patient was taken to the recovery room in good and stable condition.  The patient tolerated the procedure well.  The patient  was instructed to refrain from any heavy lifting, bending, stooping, or straining activities, and to follow up in the morning for routine postoperative care with Dr. Franklin.

## 2017-10-11 NOTE — ANESTHESIA POSTPROCEDURE EVALUATION
"Anesthesia Post Evaluation    Patient: Ulysses R Jones Jr.    Procedure(s) Performed: Procedure(s) (LRB):  PHACOEMULSIFICATION-ASPIRATION-CATARACT (Left)  INSERTION-INTRAOCULAR LENS (IOL) (Left)  INSERTION-IMPLANT-GLAUCOMA (Left)    Final Anesthesia Type: general  Patient location during evaluation: PACU  Patient participation: Yes- Able to Participate  Level of consciousness: awake and alert  Post-procedure vital signs: reviewed and stable  Pain management: adequate  Airway patency: patent  PONV status at discharge: No PONV  Anesthetic complications: no      Cardiovascular status: blood pressure returned to baseline  Respiratory status: unassisted  Hydration status: euvolemic  Follow-up not needed.        Visit Vitals  /68 (BP Location: Right arm, Patient Position: Sitting)   Pulse 80   Temp 36.6 °C (97.9 °F) (Temporal)   Resp 16   Ht 5' 6" (1.676 m)   Wt 72.6 kg (160 lb)   SpO2 98%   BMI 25.82 kg/m²       Pain/Clemente Score: Pain Assessment Performed: Yes (10/11/2017  1:44 PM)  Presence of Pain: denies (10/11/2017  1:44 PM)  Clemente Score: 10 (10/11/2017  1:44 PM)      "

## 2017-10-11 NOTE — DISCHARGE SUMMARY
Date of Procedure / Discharge: 10/11/2017     PreOp Diagnosis: 1. Cataract OS                                 2. Uncontrolled Glaucoma OS     PostOp Diagnosis:as above s/p procedure    Procedure:1. Cataract Extraction OS w/ Phacoemulsification and PCIOL placement                    2. Baerveldt Glaucoma Implant Placement OS    Attending:Promise Franklin MD    Assistant:Shawn nation MD      Anesthesia:General    Complications:: None    Blood loss: <5 CC    Specimens: none    Procedure Details:  See Dictation      -D/C home when patient meets anesthesia requirements  -Follow up tomorrow with surgeon in the [facility].

## 2017-10-11 NOTE — PLAN OF CARE
Pt states lozenges have relieved the sore throat symptoms. Ready to be discharged. Discharge instructions given, patient verbalized understanding. Consents in chart, Vitals stable, no complaints.

## 2017-10-11 NOTE — PROGRESS NOTES
Pt states sore throat still bothering him even after tylenol. He requests throat lozenges. Requested from central supply.

## 2017-10-11 NOTE — TRANSFER OF CARE
"Anesthesia Transfer of Care Note    Patient: Ulysses R Jones Jr.    Procedure(s) Performed: Procedure(s) (LRB):  PHACOEMULSIFICATION-ASPIRATION-CATARACT (Left)  INSERTION-INTRAOCULAR LENS (IOL) (Left)  INSERTION-IMPLANT-GLAUCOMA (Left)    Patient location: PACU    Anesthesia Type: general    Transport from OR: Transported from OR on room air with adequate spontaneous ventilation    Post pain: adequate analgesia    Post assessment: no apparent anesthetic complications and tolerated procedure well    Post vital signs: stable    Level of consciousness: awake, alert and oriented    Nausea/Vomiting: no nausea/vomiting    Complications: none    Transfer of care protocol was followed      Last vitals:   Visit Vitals  /64 (BP Location: Right arm, Patient Position: Lying)   Pulse 80   Temp 36.7 °C (98.1 °F) (Oral)   Resp 16   Ht 5' 6" (1.676 m)   Wt 72.6 kg (160 lb)   SpO2 100%   BMI 25.82 kg/m²     "

## 2017-10-11 NOTE — DISCHARGE INSTRUCTIONS
Discharge Instructions for Cataract Surgery  A surgeon removed the cloudy lens in your eye and replaced it with a clear man-made lens. Be sure to have an adult family member or friend drive you home after surgery. Heres what you can expect following surgery and tips for a healthy recovery.  It is normal to have the following:  · Bruised or bloodshot eye for 7 days  · Itching and mild discomfort for several days  · Some fluid discharge  · Sensitivity to light  · Scratchy, sandlike feeling in the eye for 2 weeks  · Feeling tired, especially during the first 24 hours  Activity level  · Do not drive for 2 days or as instructed by your doctor.  · Do not drink alcohol for at least 24 hours.  · Avoid bending at the waist to  objects or lifting anything heavy for 2 days.  · Relax for the first 24 hours after surgery. Watching TV and reading are OK and wont harm your eye.  Eye protection  · Do not rub or press on your eye.  · Sleep on your back or on your unoperated side for 2 nights.  · If instructed, wear a bandage over your eye for 2 days and 2 nights.  · If instructed, wear a shield to protect your eye for 2 days and 2 nights.  Using eyedrops  You may be given special eyedrops or ointment. Here is one way to use eyedrops:  · Tilt your head back.  · Pull your bottom eyelid down.  · Squeeze one drop into your eye. Do not touch your eye with the bottle tip.  · Close your eyes for a few seconds.  · If you need more than one drop, wait a few minutes before adding the next one.   Call your doctor right away if you have any of the following:  · Bleeding or discharge from the eye  · Your vision suddenly becomes worse  · Pain medicine you are told to take does not ease your pain  · Nausea or vomiting  · Chills or fever over 100.4°F (39.1°C)   Date Last Reviewed: 5/30/2015  © 3635-6565 [employer]. [address] [place] [address], [place] [address]. All rights reserved. This information is not intended as a  substitute for professional medical care. Always follow your healthcare professional's instructions.

## 2017-10-11 NOTE — INTERVAL H&P NOTE
History and physical dated 9/29/17.  I have seen and examined the patient and I have no pertinent updates.

## 2017-10-12 ENCOUNTER — OFFICE VISIT (OUTPATIENT)
Dept: OPHTHALMOLOGY | Facility: CLINIC | Age: 77
End: 2017-10-12
Payer: MEDICARE

## 2017-10-12 DIAGNOSIS — Z96.1 PSEUDOPHAKIA OF BOTH EYES: ICD-10-CM

## 2017-10-12 DIAGNOSIS — H40.1133 PRIMARY OPEN ANGLE GLAUCOMA OF BOTH EYES, SEVERE STAGE: ICD-10-CM

## 2017-10-12 DIAGNOSIS — H40.043 STEROID RESPONDER, BILATERAL: ICD-10-CM

## 2017-10-12 DIAGNOSIS — Z48.89 ENCOUNTER FOR POSTOPERATIVE CARE: Primary | ICD-10-CM

## 2017-10-12 LAB — POCT GLUCOSE: 114 MG/DL (ref 70–110)

## 2017-10-12 PROCEDURE — 99999 PR PBB SHADOW E&M-EST. PATIENT-LVL II: CPT | Mod: PBBFAC,,, | Performed by: OPHTHALMOLOGY

## 2017-10-12 PROCEDURE — 99212 OFFICE O/P EST SF 10 MIN: CPT | Mod: PBBFAC | Performed by: OPHTHALMOLOGY

## 2017-10-12 PROCEDURE — 99024 POSTOP FOLLOW-UP VISIT: CPT | Mod: ,,, | Performed by: OPHTHALMOLOGY

## 2017-10-12 RX ORDER — MOXIFLOXACIN 5 MG/ML
1 SOLUTION/ DROPS OPHTHALMIC 4 TIMES DAILY
COMMUNITY
Start: 2017-10-12 | End: 2017-10-16

## 2017-10-12 RX ORDER — PREDNISOLONE ACETATE 10 MG/ML
1 SUSPENSION/ DROPS OPHTHALMIC 4 TIMES DAILY
COMMUNITY
Start: 2017-10-12 | End: 2017-10-16 | Stop reason: SDUPTHER

## 2017-10-12 NOTE — PROGRESS NOTES
HPI     DLS: 9/29/17    Pt here for 1 day post phaco w/GDD- OS- 10/11/17  Pt states OS is very painful this morning.     Eye drops- pre op    azopt ou   Timolol ou   Latanoprost ou     1. Post operative state  2. Nuclear sclerotic cataract of left eye  3. Primary open angle glaucoma of both eyes, severe stage  4. Small pupil  5. Steroid responder, bilateral  6. Glaucoma shunt device of right eye  7. Pseudophakia, right eye     Last edited by Promise Franklin MD on 10/12/2017  8:45 AM. (History)            Assessment /Plan     For exam results, see Encounter Report.    Encounter for postoperative care    Primary open angle glaucoma of both eyes, severe stage    Steroid responder, bilateral    Glaucoma shunt device, both eyes    Pseudophakia of both eyes          Old pt of  Dr. Joyce ok  C/O epiphora os - s/p probing and irrigation by [facility] - but still persisit     1. POAG (primary open-angle glaucoma) -Advanced   Treated for glaucoma elsewhere x many years  Pt referred in by Dr. Joyce - ?? Needs trab os to get off some gtts  S/p Barveldt ([place]) OD 2011  Prev SLT OD(?)   First HVF  - [facility] 2013   First photos   [facility] - 2013   Treatment / Drops started   Many years ago - elsewhwere           Family history    ?        Glaucoma meds     Timolol ou , latanoprost ou , brimonidine os, dorzolamide os        H/O adverse rxn to glaucoma drops    C/O irritation os with brimonidine and dorzolamide and alphagan P        LASERS    ?        GLAUCOMA SURGERIES    Baerveldt od - still phakic / [place] - Dr. Mahdi Patrick 2/2011         OTHER EYE SURGERIES    Complex phaco/IOL od 3/18/2015         CDR    0.9 w/ inf notch /0.8        Tbase    ??          Tmax    ??            Ttarget    15/15            HVF    1 outside test 6/15/2012 - Dr. Joyce                   2 [facility] test 2013 - 2014 - SALT / IAD od // early SAD os                   3 2017 OD 24-2 stim V dense SALT/IAD // OS 24-2 ss SAD/IAD        Gonio    +3  S/T/N, PAS I od // +3 S/T/N , PAS I os        CCT    512/494        OCT   3 test 2012 to 2017 - RNFL - OD:dec S/I/N // OS:dec S/I/N        HRT    4 test 2013 to 2016 - -MR -  Bord inf but unreliable // bord. S/T os /// CDR 0.49 od // 0.69 os        Disc photos    2013, 2016  - OIS    - Ttoday    16/18  - Test done today post-op phaco / IOL / baerveldt OS - small pupil - needs general anesthesia       2. 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  Nuclear sclerosis OU - monitor  - check AR/MR/BAT  BVCA 8/2013 - 20/50 and 20/25  BATh 8/2013 - 20/400 and 20/50    5. ? APD od - re-check          Plan  POAG OD >> OS  S/P glaucoma tube OD  - baerveldt - [place] - Dr. Mahdi Patrick - 2011  Now has insurance again and is seeing Dr. Joyce  Pt has stopped alphagan P - per Dr. Joyce - he was C/O eye irritation and tearing    -IOP borderline - and ++ VF progression OS      - if IOP goes up OD  - consider a 2nd GDD in the IN quadrant      -intolerant to alphagan P    Glaucoma gtts   Timolol ou bid - cont to use ou post op   azopt tid ou - cont to use ou post op   latanoprost q hs ou- change to ID only post op combined os     - ?? APD od - difficult to tell - may have a reverse APD   - too narrow for SLT os - may open post phacol     Blepharitits -- C/O OF Continued TEARING. OS>OD // this is a chronic - ongoing issue    WC/LH/EES oint and AT's --> reinforced lid hygiene  BID    RE-START ERYTHROMYCIN OINTMENT TO EYE LIDS QHS/PRN --> pt states he is not using this --> educated to re-start       S/P complex - phaco/IOL // + trypan blue and ? Yessica ramirez - had IFIS in other eye // and GDD - ?? ahmed vs Baerveldt - may do better long term with Baerveldt - GENERAL ANESTHESIA AGAIN -  10/11/17    Phaco / IOL / baerveldt - 250  OS Date: 10/12/2017 / general anesthesia   POD # 1 - phaco/IOL   Doing well  Begin Pred Acetate QID   Begin vigamox QID  Cont  timolol bid ou   Cont azopt tid ou  Hold latanoprost os - use OD only   maxitrol ointment qid after drops - pt C/O FB sensation 2/2 sutures     Shield at night  Glasses day  No lifting, no bending, no eye rubbing  F/U Monday , AR/MR ou    IOL CALC OS  SN60WF 21.50 D  ACIOL 18.0 D    Risks and benefits discussed and consent signed.

## 2017-10-16 ENCOUNTER — OFFICE VISIT (OUTPATIENT)
Dept: OPHTHALMOLOGY | Facility: CLINIC | Age: 77
End: 2017-10-16
Payer: MEDICARE

## 2017-10-16 DIAGNOSIS — Z96.1 PSEUDOPHAKIA OF BOTH EYES: ICD-10-CM

## 2017-10-16 DIAGNOSIS — Z48.89 ENCOUNTER FOR POSTOPERATIVE CARE: Primary | ICD-10-CM

## 2017-10-16 DIAGNOSIS — H40.043 STEROID RESPONDER, BILATERAL: ICD-10-CM

## 2017-10-16 DIAGNOSIS — H40.1133 PRIMARY OPEN ANGLE GLAUCOMA OF BOTH EYES, SEVERE STAGE: ICD-10-CM

## 2017-10-16 PROCEDURE — 99024 POSTOP FOLLOW-UP VISIT: CPT | Mod: ,,, | Performed by: OPHTHALMOLOGY

## 2017-10-16 PROCEDURE — 99212 OFFICE O/P EST SF 10 MIN: CPT | Mod: PBBFAC | Performed by: OPHTHALMOLOGY

## 2017-10-16 PROCEDURE — 99999 PR PBB SHADOW E&M-EST. PATIENT-LVL II: CPT | Mod: PBBFAC,,, | Performed by: OPHTHALMOLOGY

## 2017-10-16 RX ORDER — PREDNISOLONE ACETATE 10 MG/ML
1 SUSPENSION/ DROPS OPHTHALMIC 3 TIMES DAILY
Qty: 10 ML | Refills: 1 | Status: SHIPPED | OUTPATIENT
Start: 2017-10-16 | End: 2017-12-14 | Stop reason: SDUPTHER

## 2017-10-16 NOTE — PROGRESS NOTES
HPI     DLS: 10/12/17    Pt here for 1 day post phaco w/GDD- OS- 10/11/17  Pt states OS is feeling better than last visit.     Meds: PA qid             Vigmaox qid            Timolol bid ou            Azopt tid ou            Latanoprost qhs od            Maxitrol oint qid (No using the ointment)    1. Post operative state  2. Primary open angle glaucoma of both eyes, severe stage  3. Steroid responder, bilateral  4. Glaucoma shunt device, both eyes  5. Pseudophakia, both eyes     Last edited by Asia Wu on 10/16/2017 10:53 AM. (History)            Assessment /Plan     For exam results, see Encounter Report.    Encounter for postoperative care    Primary open angle glaucoma of both eyes, severe stage    Steroid responder, bilateral    Glaucoma shunt device, both eyes    Pseudophakia of both eyes          Old pt of  Dr. Joyce ok  C/O epiphora os - s/p probing and irrigation by [facility] - but still persisit     1. POAG (primary open-angle glaucoma) -Advanced   Treated for glaucoma elsewhere x many years  Pt referred in by Dr. Joyce - ?? Needs trab os to get off some gtts  S/p Barveldt ([place]) OD 2011  Prev SLT OD(?)   First HVF  - [facility] 2013   First photos   [facility] - 2013   Treatment / Drops started   Many years ago - elsewhwere           Family history    ?        Glaucoma meds     Timolol ou , latanoprost ou , brimonidine os, dorzolamide os        H/O adverse rxn to glaucoma drops    C/O irritation os with brimonidine and dorzolamide and alphagan P        LASERS    ?        GLAUCOMA SURGERIES    Baerveldt od - still phakic / [place] - Dr. Mahdi Patrick 2/2011         OTHER EYE SURGERIES    Complex phaco/IOL od 3/18/2015         CDR    0.9 w/ inf notch /0.8        Tbase    ??          Tmax    ??            Ttarget    15/15            HVF    1 outside test 6/15/2012 - Dr. Joyce                   2 [facility] test 2013 - 2014 - SALT / IAD od // early SAD os                   3 2017 OD 24-2 stim V dense  SALT/IAD // OS 24-2 ss SAD/IAD        Gonio    +3 S/T/N, PAS I od // +3 S/T/N , PAS I os        CCT    512/494        OCT   3 test 2012 to 2017 - RNFL - OD:dec S/I/N // OS:dec S/I/N        HRT    4 test 2013 to 2016 - -MR -  Bord inf but unreliable // bord. S/T os /// CDR 0.49 od // 0.69 os        Disc photos    2013, 2016  - OIS    - Ttoday    16/18  - Test done today post-op phaco / IOL / baerveldt OS - small pupil - needs general anesthesia       2. 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  Nuclear sclerosis OU - monitor  - check AR/MR/BAT  BVCA 8/2013 - 20/50 and 20/25  BATh 8/2013 - 20/400 and 20/50    5. ? APD od - re-check          Plan  POAG OD >> OS  S/P glaucoma tube OD  - baerveldt - [place] - Dr. Mahdi Patrick - 2011  Now has insurance again and is seeing Dr. Joyce  Pt has stopped alphagan P - per Dr. Joyce - he was C/O eye irritation and tearing    -IOP borderline - and ++ VF progression OS      - if IOP goes up OD  - consider a 2nd GDD in the IN quadrant      -intolerant to alphagan P    Glaucoma gtts   Timolol ou bid - cont to use ou post op   azopt tid ou - cont to use ou post op   latanoprost q hs ou- change to ID only post op combined os     - ?? APD od - difficult to tell - may have a reverse APD   - too narrow for SLT os - may open post phacol     Blepharitits -- C/O OF Continued TEARING. OS>OD // this is a chronic - ongoing issue    WC/LH/EES oint and AT's --> reinforced lid hygiene  BID    RE-START ERYTHROMYCIN OINTMENT TO EYE LIDS QHS/PRN --> pt states he is not using this --> educated to re-start       S/P complex - phaco/IOL // + trypan blue and ? Yessica ramirez - had IFIS in other eye // and GDD - ?? ahmed vs Baerveldt - may do better long term with Baerveldt - GENERAL ANESTHESIA AGAIN -  10/11/17    Phaco / IOL / baerveldt - 250  OS Date: 10/12/2017 / general anesthesia SN60WF 21.50 D  POD # 5 - phaco/IOL    Doing well  Begin Pred Acetate QID / decrease to tid  Begin vigamox QID - stop / finish   Cont timolol bid ou   Cont azopt tid ou  Hold latanoprost os - use OD only   maxitrol ointment qid after drops - pt C/O FB sensation 2/2 sutures - has not been using, will start again    Shield at night  Glasses day  No lifting, no bending, no eye rubbing  F/U 3 week, AR/MR ou

## 2017-10-24 ENCOUNTER — OFFICE VISIT (OUTPATIENT)
Dept: OPHTHALMOLOGY | Facility: CLINIC | Age: 77
End: 2017-10-24
Payer: MEDICARE

## 2017-10-24 DIAGNOSIS — H40.1133 PRIMARY OPEN ANGLE GLAUCOMA OF BOTH EYES, SEVERE STAGE: ICD-10-CM

## 2017-10-24 DIAGNOSIS — Z96.1 PSEUDOPHAKIA OF BOTH EYES: ICD-10-CM

## 2017-10-24 DIAGNOSIS — Z48.89 ENCOUNTER FOR POSTOPERATIVE CARE: Primary | ICD-10-CM

## 2017-10-24 DIAGNOSIS — H40.043 STEROID RESPONDER, BILATERAL: ICD-10-CM

## 2017-10-24 PROCEDURE — 99999 PR PBB SHADOW E&M-EST. PATIENT-LVL II: CPT | Mod: PBBFAC,,, | Performed by: OPHTHALMOLOGY

## 2017-10-24 PROCEDURE — 99024 POSTOP FOLLOW-UP VISIT: CPT | Mod: ,,, | Performed by: OPHTHALMOLOGY

## 2017-10-24 PROCEDURE — 99212 OFFICE O/P EST SF 10 MIN: CPT | Mod: PBBFAC,PO | Performed by: OPHTHALMOLOGY

## 2017-10-24 NOTE — PROGRESS NOTES
HPI     Post-op Evaluation    Additional comments: Pt c/o painful OS and very blurred vision OS x 2   days           Comments   S/p Combined OS (phaco IOL and Baerveldt) 10/12/17    MEDS:  PA QID OS  Vigamox QID OS  Maxitrol antoni QID OS (only uses sometimes)  Timolol BID OU  Azopt TID OU  Latanoprost QHS OD       Last edited by Sharmin Simons MA on 10/24/2017  1:26 PM. (History)            Assessment /Plan     For exam results, see Encounter Report.    Encounter for postoperative care    Primary open angle glaucoma of both eyes, severe stage    Steroid responder, bilateral    Glaucoma shunt device, both eyes    Pseudophakia of both eyes        Old pt of  Dr. Joyce ok  C/O epiphora os - s/p probing and irrigation by [facility] - but still persisit     1. POAG (primary open-angle glaucoma) -Advanced   Treated for glaucoma elsewhere x many years  Pt referred in by Dr. Joyce - ?? Needs trab os to get off some gtts  S/p Barveldt ([place]) OD 2011  Prev SLT OD(?)   First HVF  - [facility] 2013   First photos   [facility] - 2013   Treatment / Drops started   Many years ago - elsewhwere           Family history    ?        Glaucoma meds     Timolol ou , latanoprost ou , brimonidine os, dorzolamide os        H/O adverse rxn to glaucoma drops    C/O irritation os with brimonidine and dorzolamide and alphagan P        LASERS    ?        GLAUCOMA SURGERIES    Baerveldt od - still phakic / [place] - Dr. Mahdi Patrick 2/2011         OTHER EYE SURGERIES    Complex phaco/IOL od 3/18/2015         CDR    0.9 w/ inf notch /0.8        Tbase    ??          Tmax    ??            Ttarget    15/15            HVF    1 outside test 6/15/2012 - Dr. Joyce                   2 [facility] test 2013 - 2014 - SALT / IAD od // early SAD os                   3 2017 OD 24-2 stim V dense SALT/IAD // OS 24-2 ss SAD/IAD        Gonio    +3 S/T/N, PAS I od // +3 S/T/N , PAS I os        CCT    512/494        OCT   3 test 2012 to 2017 - RNFL - OD:dec S/I/N // OS:dec  S/I/N        HRT    4 test 2013 to 2016 - -MR -  Bord inf but unreliable // bord. S/T os /// CDR 0.49 od // 0.69 os        Disc photos    2013, 2016  - OIS    - Ttoday    15/20  - Test done today post-op phaco / IOL / baerveldt OS - eye pain       2. 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  Nuclear sclerosis OU - monitor  - check AR/MR/BAT  BVCA 8/2013 - 20/50 and 20/25  BATh 8/2013 - 20/400 and 20/50    5. ? APD od - re-check          Plan  POAG OD >> OS  S/P glaucoma tube OD  - baerveldt - [place] - Dr. Mahdi Patrick - 2011  Now has insurance again and is seeing Dr. Joyce  Pt has stopped alphagan P - per Dr. Joyce - he was C/O eye irritation and tearing    -IOP borderline - and ++ VF progression OS      - if IOP goes up OD  - consider a 2nd GDD in the IN quadrant      -intolerant to alphagan P    Glaucoma gtts   Timolol ou bid - cont to use ou post op   azopt tid ou - cont to use ou post op   latanoprost q hs ou- change to ID only post op combined os     - ?? APD od - difficult to tell - may have a reverse APD   - too narrow for SLT os - may open post phacol     Blepharitits -- C/O OF Continued TEARING. OS>OD // this is a chronic - ongoing issue    WC/LH/EES oint and AT's --> reinforced lid hygiene  BID    RE-START ERYTHROMYCIN OINTMENT TO EYE LIDS QHS/PRN --> pt states he is not using this --> educated to re-start       S/P complex - phaco/IOL // + trypan blue and ? Yessica ramirez - had IFIS in other eye // and GDD - ?? ahmed vs Baerveldt - may do better long term with Baerveldt - GENERAL ANESTHESIA AGAIN -  10/11/17    Phaco / IOL / baerveldt - 250  OS Date: 10/12/2017 / general anesthesia SN60WF 21.50 D  POD #12 - phaco/IOL   Doing well  Begin Pred Acetate  tid  Begin vigamox QID - stop / finish   Cont timolol bid ou   Cont azopt tid ou  Hold latanoprost os - use OD only   maxitrol ointment qid after drops - pt again is C/O  FB sensation 2/2 sutures - has not been using, will start again    Shield at night  Glasses day  No lifting, no bending, no eye rubbing  F/U as scheduled previously for post op AR/MR    Keep F/U Nov. 6th // check AR/MR // consider adding atropine os next visit in anticipation of tube opening up and cutting bback on glaucoma drops os

## 2017-11-06 ENCOUNTER — OFFICE VISIT (OUTPATIENT)
Dept: OPHTHALMOLOGY | Facility: CLINIC | Age: 77
End: 2017-11-06
Payer: MEDICARE

## 2017-11-06 DIAGNOSIS — Z48.89 ENCOUNTER FOR POSTOPERATIVE CARE: Primary | ICD-10-CM

## 2017-11-06 DIAGNOSIS — H40.1133 PRIMARY OPEN ANGLE GLAUCOMA OF BOTH EYES, SEVERE STAGE: ICD-10-CM

## 2017-11-06 DIAGNOSIS — Z96.1 PSEUDOPHAKIA OF BOTH EYES: ICD-10-CM

## 2017-11-06 DIAGNOSIS — H40.043 STEROID RESPONDER, BILATERAL: ICD-10-CM

## 2017-11-06 PROCEDURE — 99999 PR PBB SHADOW E&M-EST. PATIENT-LVL I: CPT | Mod: PBBFAC,,, | Performed by: OPHTHALMOLOGY

## 2017-11-06 PROCEDURE — 99024 POSTOP FOLLOW-UP VISIT: CPT | Mod: ,,, | Performed by: OPHTHALMOLOGY

## 2017-11-06 PROCEDURE — 99211 OFF/OP EST MAY X REQ PHY/QHP: CPT | Mod: PBBFAC | Performed by: OPHTHALMOLOGY

## 2017-11-06 NOTE — PROGRESS NOTES
HPI     Post-op Evaluation    Additional comments: Pt c/o constant teraing and burning sensation           Comments   S/p Combined OS (Phaco IOL and Baerveldt) 10/11/17    MEDS:  PA TID OS  Maxitrol antoni QID OS (only uses sometimes or not at all)  Timolol BID OU  Azopt TID OU  Latanoprost QHS OD = PT STOPPED IN OD ALSO B/C IT WAS NOT WRITTEN  ON HIS   INSTRUCTIONS    Complains of pain and epiphora OS but not using ointment.  Otherwise   stable.       Last edited by Vick Rush MD on 11/6/2017 12:37 PM. (History)            Assessment /Plan     For exam results, see Encounter Report.    Encounter for postoperative care    Primary open angle glaucoma of both eyes, severe stage    Steroid responder, bilateral    Glaucoma shunt device, both eyes    Pseudophakia of both eyes        Old pt of  Dr. Joyce ok  C/O epiphora os - s/p probing and irrigation by [facility] - but still persisit     1. POAG (primary open-angle glaucoma) -Advanced   Treated for glaucoma elsewhere x many years  Pt referred in by Dr. Joyce   S/p Barrome ([place]) OD 2011  Prev SLT OD(?)   First HVF  - [facility] 2013   First photos   [facility] - 2013   Treatment / Drops started   Many years ago - elsewhwere           Family history    ?        Glaucoma meds     Timolol ou , latanoprost ou , brimonidine os, dorzolamide os        H/O adverse rxn to glaucoma drops    C/O irritation os with brimonidine and dorzolamide and alphagan P        LASERS    ?        GLAUCOMA SURGERIES    Baerveldt od - still phakic / [place] - Dr. Mahdi Patrick 2/2011         OTHER EYE SURGERIES    Complex phaco/IOL od 3/18/2015, Phaco/IOL with BV OS 10/11/17        CDR    0.9 w/ inf notch /0.8        Tbase    ??          Tmax    ??            Ttarget    15/15            HVF    1 outside test 6/15/2012 - Dr. Joyce                   2 [facility] test 2013 - 2014 - SALT / IAD od // early SAD os                   3 2017 OD 24-2 stim V dense SALT/IAD // OS 24-2 ss SAD/IAD         Gonio    +3 S/T/N, PAS I od // +3 S/T/N , PAS I os        CCT    512/494        OCT   3 test 2012 to 2017 - RNFL - OD:dec S/I/N // OS:dec S/I/N        HRT    4 test 2013 to 2016 - -MR -  Bord inf but unreliable // bord. S/T os /// CDR 0.49 od // 0.69 os        Disc photos    2013, 2016  - OIS    - Ttoday    16 // 20  - Test done today post-op phaco / IOL / baerveldt OS       2. 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  Nuclear sclerosis OU - monitor  - check AR/MR/BAT  BVCA 8/2013 - 20/50 and 20/25  BATh 8/2013 - 20/400 and 20/50    5. ? APD od - re-check          Plan  POAG OD >> OS  S/P glaucoma tube OD  - baerveldt - [place] - Dr. Mahdi Patrick - 2011  Now has insurance again and is seeing Dr. Joyce  Pt has stopped alphagan P - per Dr. Joyce - he was C/O eye irritation and tearing    -IOP borderline - and ++ VF progression OS      - if IOP goes up OD  - consider a 2nd GDD in the IN quadrant      -intolerant to alphagan P    Glaucoma gtts   Timolol ou bid - cont to use ou post op   azopt tid ou - cont to use ou post op   latanoprost q hs ou- change to ID only post op combined os     - ?? APD od - difficult to tell - may have a reverse APD   - too narrow for SLT os - may open post phaco    Blepharitits -- C/O OF Continued TEARING. OS>OD // this is a chronic - ongoing issue    WC/LH/EES oint and AT's --> reinforced lid hygiene  BID    RE-START ERYTHROMYCIN OINTMENT TO EYE LIDS QHS/PRN --> pt states he is not using this --> educated to re-start       S/P complex - phaco/IOL // + trypan blue and ? Yessica ring - had IFIS in other eye // and GDD - ?? ahmed vs Baerveldt - may do better long term with Baerveldt - GENERAL ANESTHESIA AGAIN -  10/11/17    Phaco / IOL / baerveldt - 250  OS Date: 10/11/2017 / general anesthesia SN60WF 21.50 D  POM#1 - phaco/IOL   Doing well  Pred Acetate  TID  Cont timolol bid ou   Cont azopt tid ou  Hold  latanoprost os - use OD only   maxitrol ointment qid after drops - pt again is C/O FB sensation 2/2 sutures - has not been using QID, discussed again    Shield at night  Glasses day  No lifting, no bending, no eye rubbing  F/U as scheduled previously for post op AR/MR     Keep F/U Nov. 1-2 weeks - at that visit tube may be ready to open - may or may not shallow // may or may not need atropine - consider stopping glaucoma gtts os at next visit   // check AR/MR // consider adding atropine os next visit in anticipation of tube opening up and cutting back on glaucoma drops os

## 2017-11-20 ENCOUNTER — OFFICE VISIT (OUTPATIENT)
Dept: OPHTHALMOLOGY | Facility: CLINIC | Age: 77
End: 2017-11-20
Payer: MEDICARE

## 2017-11-20 DIAGNOSIS — Z96.1 PSEUDOPHAKIA OF BOTH EYES: ICD-10-CM

## 2017-11-20 DIAGNOSIS — H40.1133 PRIMARY OPEN ANGLE GLAUCOMA OF BOTH EYES, SEVERE STAGE: ICD-10-CM

## 2017-11-20 DIAGNOSIS — Z48.89 ENCOUNTER FOR POSTOPERATIVE CARE: Primary | ICD-10-CM

## 2017-11-20 DIAGNOSIS — H40.043 STEROID RESPONDER, BILATERAL: ICD-10-CM

## 2017-11-20 PROCEDURE — 99211 OFF/OP EST MAY X REQ PHY/QHP: CPT | Mod: PBBFAC | Performed by: OPHTHALMOLOGY

## 2017-11-20 PROCEDURE — 99024 POSTOP FOLLOW-UP VISIT: CPT | Mod: ,,, | Performed by: OPHTHALMOLOGY

## 2017-11-20 PROCEDURE — 99999 PR PBB SHADOW E&M-EST. PATIENT-LVL I: CPT | Mod: PBBFAC,,, | Performed by: OPHTHALMOLOGY

## 2017-11-20 NOTE — PROGRESS NOTES
HPI     DLS: 11/06/17    Pt here for S/P Combined OS (phaco w/IOL and Baerveldt)- 10/11/17  Pt states OS has been tearing all morning.     Meds: PA tid os            Maxitrol antoni qid os            Timolol bid ou            Azopt tid ou            Latanoprost qhs od                 1. Post operative state  2. Primary open angle glaucoma of both eyes, severe stage  3. Steroid responder, bilateral  4. Glaucoma shunt device, both eyes  5. Pseudophakia, both eyes     Last edited by Asia Wu on 11/20/2017 10:57 AM. (History)            Assessment /Plan     For exam results, see Encounter Report.    Encounter for postoperative care    Primary open angle glaucoma of both eyes, severe stage    Steroid responder, bilateral    Glaucoma shunt device, both eyes    Pseudophakia of both eyes        Old pt of  Dr. Joyce ok  C/O epiphora os - s/p probing and irrigation by Isiah - but still persisit     1. POAG (primary open-angle glaucoma) -Advanced   Treated for glaucoma elsewhere x many years  Pt referred in by Dr. Joyce   S/p [place] ([place]) OD 2011  Prev SLT OD(?)   First HVF  - [facility] 2013   First photos   [facility] - 2013   Treatment / Drops started   Many years ago - elsewhwere           Family history    ?        Glaucoma meds     Timolol ou , latanoprost ou , brimonidine os, dorzolamide os        H/O adverse rxn to glaucoma drops    C/O irritation os with brimonidine and dorzolamide and alphagan P        LASERS    ?        GLAUCOMA SURGERIES    Baerveldt od - still phakic / [place] - Dr. Mahdi Patrick 2/2011         OTHER EYE SURGERIES    Complex phaco/IOL od 3/18/2015, Phaco/IOL with BV OS 10/11/17        CDR    0.9 w/ inf notch /0.8        Tbase    ??          Tmax    ??            Ttarget    15/15            HVF    1 outside test 6/15/2012 - Dr. Joyce                   2 [facility] test 2013 - 2014 - SALT / IAD od // early SAD os                   3 2017 OD 24-2 stim V dense SALT/IAD // OS 24-2 ss SAD/IAD         Gonio    +3 S/T/N, PAS I od // +3 S/T/N , PAS I os        CCT    512/494        OCT   3 test 2012 to 2017 - RNFL - OD:dec S/I/N // OS:dec S/I/N        HRT    4 test 2013 to 2016 - -MR -  Bord inf but unreliable // bord. S/T os /// CDR 0.49 od // 0.69 os        Disc photos    2013, 2016  - OIS    - Ttoday    16 // 20  - Test done today post-op phaco / IOL / baerveldt OS       2. 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  Nuclear sclerosis OU - monitor  - check AR/MR/BAT  BVCA 8/2013 - 20/50 and 20/25  BATh 8/2013 - 20/400 and 20/50    5. ? APD od - re-check          Plan  POAG OD >> OS  S/P glaucoma tube OD  - baerveldt - [place] - Dr. Mahdi Patrick - 2011  Now has insurance again and is seeing Dr. Joyce  Pt has stopped alphagan P - per Dr. Joyce - he was C/O eye irritation and tearing    -IOP borderline - and ++ VF progression OS      - if IOP goes up OD  - consider a 2nd GDD in the IN quadrant      -intolerant to alphagan P    Glaucoma gtts   Timolol ou bid - cont to use ou post op   azopt tid ou - cont to use ou post op   latanoprost q hs ou- change to ID only post op combined os     - ?? APD od - difficult to tell - may have a reverse APD   - too narrow for SLT os - may open post phaco    Blepharitits -- C/O OF Continued TEARING. OS>OD // this is a chronic - ongoing issue    WC/LH/EES oint and AT's --> reinforced lid hygiene  BID    RE-START ERYTHROMYCIN OINTMENT TO EYE LIDS QHS/PRN --> pt states he is not using this --> educated to re-start       S/P complex - phaco/IOL // + trypan blue and ? Yessica ring - had IFIS in other eye // and GDD - ?? ahmed vs Baerveldt - may do better long term with Baerveldt - GENERAL ANESTHESIA AGAIN -  10/11/17    Phaco / IOL / baerveldt - 250  OS Date: 10/11/2017 / general anesthesia SN60WF 21.50 D  POW 5 - phaco/IOL   Doing well  Pred Acetate  TID  Cont timolol bid - STOP os - tube has opened  (11/20/2017)  Cont azopt tid ou - STP os - tube opened (11/20/2017)  Hold latanoprost os - use OD only   maxitrol ointment q hs OS   - pt still  C/O FB sensation 2/2 sutures -  Hold off on atropine - tube has opened and chamber still deep     T= 8 (tube has opened 11/20/2017 - IOP low and fibrin in AC - chamber deep // [facility] good)     Shield at night  Glasses day  No lifting, no bending, no eye rubbing  F/U as scheduled previously for post op AR/MR     Keep F/U Nov. 2 - 3 weeks // dilate for fundus exam

## 2017-12-14 ENCOUNTER — OFFICE VISIT (OUTPATIENT)
Dept: OPHTHALMOLOGY | Facility: CLINIC | Age: 77
End: 2017-12-14
Payer: MEDICARE

## 2017-12-14 DIAGNOSIS — Z96.1 PSEUDOPHAKIA OF BOTH EYES: ICD-10-CM

## 2017-12-14 DIAGNOSIS — H40.043 STEROID RESPONDER, BILATERAL: ICD-10-CM

## 2017-12-14 DIAGNOSIS — Z48.89 ENCOUNTER FOR POSTOPERATIVE CARE: ICD-10-CM

## 2017-12-14 DIAGNOSIS — H40.1133 PRIMARY OPEN ANGLE GLAUCOMA OF BOTH EYES, SEVERE STAGE: Primary | ICD-10-CM

## 2017-12-14 PROCEDURE — 99213 OFFICE O/P EST LOW 20 MIN: CPT | Mod: PBBFAC | Performed by: OPHTHALMOLOGY

## 2017-12-14 PROCEDURE — 99024 POSTOP FOLLOW-UP VISIT: CPT | Mod: ,,, | Performed by: OPHTHALMOLOGY

## 2017-12-14 PROCEDURE — 99999 PR PBB SHADOW E&M-EST. PATIENT-LVL III: CPT | Mod: PBBFAC,,, | Performed by: OPHTHALMOLOGY

## 2017-12-14 RX ORDER — PREDNISOLONE ACETATE 10 MG/ML
1 SUSPENSION/ DROPS OPHTHALMIC 2 TIMES DAILY
Qty: 10 ML | Refills: 0 | Status: SHIPPED | OUTPATIENT
Start: 2017-12-14 | End: 2018-01-04 | Stop reason: SDUPTHER

## 2017-12-14 RX ORDER — CEFUROXIME AXETIL 250 MG/1
TABLET ORAL
COMMUNITY
Start: 2017-12-05 | End: 2018-01-04

## 2017-12-14 RX ORDER — POLYETHYLENE GLYCOL 3350 17 G/17G
POWDER, FOR SOLUTION ORAL
COMMUNITY
Start: 2017-12-04 | End: 2018-05-11

## 2017-12-14 NOTE — PROGRESS NOTES
HPI     DLS: 11/20/17    Pt here for post combined phaco w/IOL and Baervedlt- OS -  10/11/17    Meds: PA tid os             Maxitrol antoni qhs os             Timolol bid od             Azopt tid od             Latanoprost qhs od     1. Post operative state   2. Primary open angle glaucoma of both eyes, severe stage   3. Steroid responder, bilateral   4. Glaucoma shunt device, both eyes   5. Pseudophakia, both eyes     Last edited by Promise Franklin MD on 12/14/2017 11:57 AM. (History)            Assessment /Plan     For exam results, see Encounter Report.    Primary open angle glaucoma of both eyes, severe stage    Encounter for postoperative care  -     prednisoLONE acetate (PRED FORTE) 1 % DrpS; Place 1 drop into the left eye 2 (two) times daily.  Dispense: 10 mL; Refill: 0    Steroid responder, bilateral    Glaucoma shunt device, both eyes    Pseudophakia of both eyes        Old pt of  Dr. Joyce ok  C/O epiphora os - s/p probing and irrigation by Isiah - but still persisit     1. POAG (primary open-angle glaucoma) -Advanced   Treated for glaucoma elsewhere x many years  Pt referred in by Dr. Joyce   S/p [place] ([place]) OD 2011  Prev SLT OD(?)   First HVF  - [facility] 2013   First photos   [facility] - 2013   Treatment / Drops started   Many years ago - elsewhwere           Family history    ?        Glaucoma meds     Timolol ou , latanoprost ou , brimonidine os, dorzolamide os        H/O adverse rxn to glaucoma drops    C/O irritation os with brimonidine and dorzolamide and alphagan P        LASERS    ?        GLAUCOMA SURGERIES    Baerveldt od - still phakic / [place] - Dr. Mahdi Patrick 2/2011         OTHER EYE SURGERIES    Complex phaco/IOL od 3/18/2015, Phaco/IOL with BV OS 10/11/17        CDR    0.9 w/ inf notch /0.8        Tbase    ??          Tmax    ??            Ttarget    15/15            HVF    1 outside test 6/15/2012 - Dr. Joyce                   2 [facility] test 2013 - 2014 - SALT / IAD od //  early SAD os                   3 2017 OD 24-2 stim V dense SALT/IAD // OS 24-2 ss SAD/IAD        Gonio    +3 S/T/N, PAS I od // +3 S/T/N , PAS I os        CCT    512/494        OCT   3 test 2012 to 2017 - RNFL - OD:dec S/I/N // OS:dec S/I/N        HRT    4 test 2013 to 2016 - -MR -  Bord inf but unreliable // bord. S/T os /// CDR 0.49 od // 0.69 os        Disc photos    2013, 2016  - OIS    - Ttoday    16 // 20  - Test done today post-op phaco / IOL / baerveldt OS       2. 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  Nuclear sclerosis OU - monitor  - check AR/MR/BAT  BVCA 8/2013 - 20/50 and 20/25  BATh 8/2013 - 20/400 and 20/50    5. ? APD od - re-check          Plan  POAG OD >> OS  S/P glaucoma tube OD  - baerveldt - [place] - Dr. Mahdi Patrick - 2011  Now has insurance again and is seeing Dr. Joyce  Pt has stopped alphagan P - per Dr. Joyce - he was C/O eye irritation and tearing    -IOP borderline - and ++ VF progression OS      - if IOP goes up OD  - consider a 2nd GDD in the IN quadrant      -intolerant to alphagan P    Glaucoma gtts   Timolol ou bid - cont to use ou post op   azopt tid ou - cont to use ou post op   latanoprost q hs ou- change to ID only post op combined os     - ?? APD od - difficult to tell - may have a reverse APD   - too narrow for SLT os - may open post phaco    Blepharitits -- C/O OF Continued TEARING. OS>OD // this is a chronic - ongoing issue    WC/LH/EES oint and AT's --> reinforced lid hygiene  BID    RE-START ERYTHROMYCIN OINTMENT TO EYE LIDS QHS/PRN --> pt states he is not using this --> educated to re-start       S/P complex - phaco/IOL // + trypan blue and ? Yessica ramirez - had IFIS in other eye // and GDD - ?? ahmed vs Baerveldt - may do better long term with Baerveldt - GENERAL ANESTHESIA AGAIN -  10/11/17    Phaco / IOL / baerveldt - 250  OS Date: 10/11/2017 / general anesthesia SN60WF 21.50 D  POM 2   - phaco/IOL   Doing well  Pred Acetate  - decrease to bid - slow taper     Cont timolol bid - STOP os - tube has opened (11/20/2017)  Cont azopt tid ou - STP os - tube opened (11/20/2017)  Hold latanoprost os - use OD only       T=  19 (tube has opened 11/20/2017 -)  IOP creeping up - not on any glaucoma gtts os  - ?? Steroid response     F/U 4 weeks - IOP check / iritis chec - may need to add glaucoma gtts back os

## 2018-01-04 ENCOUNTER — OFFICE VISIT (OUTPATIENT)
Dept: OPHTHALMOLOGY | Facility: CLINIC | Age: 78
End: 2018-01-04
Payer: MEDICARE

## 2018-01-04 DIAGNOSIS — H21.81 INTRAOPERATIVE FLOPPY IRIS SYNDROME (IFIS): ICD-10-CM

## 2018-01-04 DIAGNOSIS — Z96.1 PSEUDOPHAKIA OF BOTH EYES: ICD-10-CM

## 2018-01-04 DIAGNOSIS — H40.043 STEROID RESPONDER, BILATERAL: ICD-10-CM

## 2018-01-04 DIAGNOSIS — Z48.89 ENCOUNTER FOR POSTOPERATIVE CARE: Primary | ICD-10-CM

## 2018-01-04 DIAGNOSIS — H40.1133 PRIMARY OPEN ANGLE GLAUCOMA OF BOTH EYES, SEVERE STAGE: ICD-10-CM

## 2018-01-04 DIAGNOSIS — H57.03 SMALL PUPIL: ICD-10-CM

## 2018-01-04 DIAGNOSIS — H20.9 IRITIS OF LEFT EYE: ICD-10-CM

## 2018-01-04 PROCEDURE — 99999 PR PBB SHADOW E&M-EST. PATIENT-LVL I: CPT | Mod: PBBFAC,,, | Performed by: OPHTHALMOLOGY

## 2018-01-04 PROCEDURE — 99024 POSTOP FOLLOW-UP VISIT: CPT | Mod: ,,, | Performed by: OPHTHALMOLOGY

## 2018-01-04 PROCEDURE — 99211 OFF/OP EST MAY X REQ PHY/QHP: CPT | Mod: PBBFAC | Performed by: OPHTHALMOLOGY

## 2018-01-04 RX ORDER — PREDNISOLONE ACETATE 10 MG/ML
1 SUSPENSION/ DROPS OPHTHALMIC EVERY MORNING
Qty: 5 ML | Refills: 1 | Status: SHIPPED | OUTPATIENT
Start: 2018-01-04 | End: 2018-03-20 | Stop reason: SDUPTHER

## 2018-01-04 NOTE — PROGRESS NOTES
HPI     Post-op Evaluation    Additional comments: Pt states no changes since last exam           Comments   S/p Combined OS (Phaco IOL and Baerveldt) 10/12/17    MEDS:  PA BID OS  Timolol BID OD  Azopt TID OD  Latanoprost QHS OD       Last edited by Sharmin Simons MA on 1/4/2018 11:21 AM. (History)            Assessment /Plan     For exam results, see Encounter Report.    Encounter for postoperative care    Primary open angle glaucoma of both eyes, severe stage    Steroid responder, bilateral    Glaucoma shunt device, both eyes    Small pupil    Intraoperative floppy iris syndrome (IFIS)    Iritis of left eye    Pseudophakia of both eyes        Old pt of  Dr. Joyce ok  C/O epiphora os - s/p probing and irrigation by Isiah - but still persisit     1. POAG (primary open-angle glaucoma) -Advanced   Treated for glaucoma elsewhere x many years  Pt referred in by Dr. Joyce   S/p [place] ([place]) OD 2011  Prev SLT OD(?)   First HVF  - [facility] 2013   First photos   [facility] - 2013   Treatment / Drops started   Many years ago - elsewhwere           Family history    ?        Glaucoma meds     Timolol ou , latanoprost ou , brimonidine os, dorzolamide os        H/O adverse rxn to glaucoma drops    C/O irritation os with brimonidine and dorzolamide and alphagan P        LASERS    ?        GLAUCOMA SURGERIES    Baerveldt od - still phakic / [place] - Dr. Mahdi Patrick 2/2011         OTHER EYE SURGERIES    Complex phaco/IOL od 3/18/2015, Phaco/IOL with BV OS 10/11/17        CDR    0.9 w/ inf notch /0.8        Tbase    ??          Tmax    ??            Ttarget    15/15            HVF    1 outside test 6/15/2012 - Dr. Joyce                   2 [facility] test 2013 - 2014 - SALT / IAD od // early SAD os                   3 2017 OD 24-2 stim V dense SALT/IAD // OS 24-2 ss SAD/IAD        Gonio    +3 S/T/N, PAS I od // +3 S/T/N , PAS I os        CCT    512/494        OCT   3 test 2012 to 2017 - RNFL - OD:dec S/I/N // OS:dec  S/I/N        HRT    4 test 2013 to 2016 - -MR -  Bord inf but unreliable // bord. S/T os /// CDR 0.49 od // 0.69 os        Disc photos    2013, 2016  - OIS    - Ttoday    16 // 20  - Test done today post-op phaco / IOL / baerveldt OS       2. 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  Nuclear sclerosis OU - monitor  - check AR/MR/BAT  BVCA 8/2013 - 20/50 and 20/25  BATh 8/2013 - 20/400 and 20/50    5. ? APD od - re-check          Plan  POAG OD >> OS  S/P glaucoma tube OD  - baerveldt - [place] - Dr. Mahdi Patrick - 2011  Now has insurance again and is seeing Dr. Joyce  Pt has stopped alphagan P - per Dr. Joyce - he was C/O eye irritation and tearing    -IOP borderline - and ++ VF progression OS      - if IOP goes up OD  - consider a 2nd GDD in the IN quadrant      -intolerant to alphagan P    Glaucoma gtts - off glaucoma gtts os post phaco/IOL / baerveldt OS and tube opened   Timolol od bid  azopt tid od   latanoprost q hs od    - ?? APD od - difficult to tell - may have a reverse APD   - too narrow for SLT os - may open post phaco    Blepharitits -- C/O OF Continued TEARING. OS>OD // this is a chronic - ongoing issue    WC/LH/EES oint and AT's --> reinforced lid hygiene  BID    RE-START ERYTHROMYCIN OINTMENT TO EYE LIDS QHS/PRN --> pt states he is not using this --> educated to re-start       S/P complex - phaco/IOL // + trypan blue and ? Yessica ramirez - had IFIS in other eye // and GDD - ?? ahmed vs Baerveldt - may do better long term with Baerveldt - GENERAL ANESTHESIA AGAIN -  10/11/17    Phaco / IOL / baerveldt - 250  OS Date: 10/11/2017 / general anesthesia SN60WF 21.50 D  POM 3  - phaco/IOL   Doing well - still with trace to +1 C&F - persistent iritis or pigment   Pred Acetate  - decrease to q day  - slow taper    tube has opened (11/20/2017)    T=  14 od / 19 os  (tube has opened 11/20/2017 -)  IOP creeping up - not on any  glaucoma gtts os  - ?? Steroid response     F/U 6  weeks - IOP check / iritis chec (still with trace to +1 C&F)  - may need to add glaucoma gtts back os // HRT // gonio

## 2018-03-20 ENCOUNTER — CLINICAL SUPPORT (OUTPATIENT)
Dept: OPHTHALMOLOGY | Facility: CLINIC | Age: 78
End: 2018-03-20
Payer: MEDICARE

## 2018-03-20 ENCOUNTER — OFFICE VISIT (OUTPATIENT)
Dept: OPHTHALMOLOGY | Facility: CLINIC | Age: 78
End: 2018-03-20
Payer: MEDICARE

## 2018-03-20 DIAGNOSIS — H40.1133 PRIMARY OPEN ANGLE GLAUCOMA OF BOTH EYES, SEVERE STAGE: ICD-10-CM

## 2018-03-20 DIAGNOSIS — H40.1133 PRIMARY OPEN ANGLE GLAUCOMA OF BOTH EYES, SEVERE STAGE: Primary | ICD-10-CM

## 2018-03-20 DIAGNOSIS — Z96.1 PSEUDOPHAKIA OF BOTH EYES: ICD-10-CM

## 2018-03-20 DIAGNOSIS — H57.03 SMALL PUPIL: ICD-10-CM

## 2018-03-20 DIAGNOSIS — H40.043 STEROID RESPONDER, BILATERAL: ICD-10-CM

## 2018-03-20 DIAGNOSIS — H21.81 INTRAOPERATIVE FLOPPY IRIS SYNDROME (IFIS): ICD-10-CM

## 2018-03-20 DIAGNOSIS — Z48.89 ENCOUNTER FOR POSTOPERATIVE CARE: ICD-10-CM

## 2018-03-20 DIAGNOSIS — H20.9 IRITIS OF LEFT EYE: ICD-10-CM

## 2018-03-20 PROCEDURE — 92133 CPTRZD OPH DX IMG PST SGM ON: CPT | Mod: 26,S$PBB,, | Performed by: OPHTHALMOLOGY

## 2018-03-20 PROCEDURE — 99999 PR PBB SHADOW E&M-EST. PATIENT-LVL I: CPT | Mod: PBBFAC,,, | Performed by: OPHTHALMOLOGY

## 2018-03-20 PROCEDURE — 92012 INTRM OPH EXAM EST PATIENT: CPT | Mod: S$PBB,,, | Performed by: OPHTHALMOLOGY

## 2018-03-20 PROCEDURE — 92134 CPTRZ OPH DX IMG PST SGM RTA: CPT | Mod: PBBFAC

## 2018-03-20 PROCEDURE — 99211 OFF/OP EST MAY X REQ PHY/QHP: CPT | Mod: PBBFAC | Performed by: OPHTHALMOLOGY

## 2018-03-20 RX ORDER — TIMOLOL MALEATE 5 MG/ML
1 SOLUTION/ DROPS OPHTHALMIC 2 TIMES DAILY
Qty: 5 ML | Refills: 12 | Status: SHIPPED | OUTPATIENT
Start: 2018-03-20 | End: 2018-05-11 | Stop reason: SDUPTHER

## 2018-03-20 RX ORDER — LATANOPROST 50 UG/ML
1 SOLUTION/ DROPS OPHTHALMIC NIGHTLY
Qty: 2.5 ML | Refills: 12 | Status: SHIPPED | OUTPATIENT
Start: 2018-03-20 | End: 2019-03-27 | Stop reason: SDUPTHER

## 2018-03-20 RX ORDER — BRINZOLAMIDE 10 MG/ML
1 SUSPENSION/ DROPS OPHTHALMIC 3 TIMES DAILY
Qty: 10 ML | Refills: 12 | Status: SHIPPED | OUTPATIENT
Start: 2018-03-20 | End: 2018-05-11 | Stop reason: SDUPTHER

## 2018-03-20 RX ORDER — PREDNISOLONE ACETATE 10 MG/ML
1 SUSPENSION/ DROPS OPHTHALMIC EVERY MORNING
Qty: 5 ML | Refills: 1 | Status: SHIPPED | OUTPATIENT
Start: 2018-03-20 | End: 2019-01-28 | Stop reason: ALTCHOICE

## 2018-03-20 NOTE — PROGRESS NOTES
"HPI     DLS: 1/04/18    Pt here for HRT review;  Pt states he feels like his vision is getting worse. Pt states when he   walks into his house and everything seems to be black "very dark" even   when they have lights on. Pt also states he is having trouble seeing the   tv screen at a distance. Pt states he does off and on serious pain to OU.   Pt c/o OU keep running water "feels like a film over vision".     Meds: Timolol bid od             Azopt tid od             Latanoprost qhs od             PA qday os    1. Post operative state   2. Primary open angle glaucoma of both eyes, severe stage   3. Steroid responder, bilateral   4. Glaucoma shunt device, both eyes   5. Pseudophakia, both eyes           Last edited by Asia Wu on 3/20/2018 11:14 AM. (History)            Assessment /Plan     For exam results, see Encounter Report.    Primary open angle glaucoma of both eyes, severe stage    Steroid responder, bilateral    Glaucoma shunt device, both eyes    Small pupil    Intraoperative floppy iris syndrome (IFIS)    Iritis of left eye    Pseudophakia of both eyes          Old pt of  Dr. Joyce ok  C/O epiphora os - s/p probing and irrigation by [facility] - but still persisit     1. POAG (primary open-angle glaucoma) -Advanced   Treated for glaucoma elsewhere x many years  Pt referred in by Dr. Joyce   S/p Fadia ([place]) OD 2011  Prev SLT OD(?)   First HVF  - [facility] 2013   First photos   [facility] - 2013   Treatment / Drops started   Many years ago - elsewhwere           Family history    ?        Glaucoma meds     Timolol ou , latanoprost ou , brimonidine os, dorzolamide os        H/O adverse rxn to glaucoma drops    C/O irritation os with brimonidine and dorzolamide and alphagan P        LASERS    ?        GLAUCOMA SURGERIES    Baerveldt od - still phakic / [place] - Dr. Mahdi Patrick 2/2011         OTHER EYE SURGERIES    Complex phaco/IOL od 3/18/2015, Phaco/IOL with BV OS 10/11/17        CDR    0.9 w/ inf " notch /0.8        Tbase    ??          Tmax    ??            Ttarget    15/15            HVF    1 outside test 6/15/2012 - Dr. Joyce                   2 [facility] test 2013 - 2014 - SALT / IAD od // early SAD os                   3 2017 OD 24-2 stim V dense SALT/IAD // OS 24-2 ss SAD/IAD        Gonio    +3 S/T/N, PAS I od // +3 S/T/N , PAS I os        CCT    512/494        OCT   3 test 2012 to 2017 - RNFL - OD:dec S/I/N // OS:dec S/I/N        HRT    4 test 2013 to 2018 - MR -  decr S/I od // decr S/N os /// CDR 0.78 od // 0.79 os          Disc photos    2013, 2016  - OIS    - Ttoday    12/16  - Test done today HRT       2. 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  Nuclear sclerosis OU - monitor  - check AR/MR/BAT  BVCA 8/2013 - 20/50 and 20/25  BATh 8/2013 - 20/400 and 20/50    5. ? APD od - re-check          Plan  POAG OD >> OS  S/P glaucoma tube OD  - baerveldt - [place] - Dr. Mahdi Patrick - 2011  Now has insurance again and is seeing Dr. Joyce  Pt has stopped alphagan P - per Dr. Joyce - he was C/O eye irritation and tearing    -IOP borderline - and ++ VF progression OS      - if IOP goes up OD  - consider a 2nd GDD in the IN quadrant      -intolerant to alphagan P    Glaucoma gtts - off glaucoma gtts os post phaco/IOL / baerveldt OS and tube opened   Timolol od bid  azopt tid od   latanoprost q hs od    - ?? APD od - difficult to tell - may have a reverse APD   - too narrow for SLT os - may open post phaco    Blepharitits -- C/O OF Continued TEARING. OS>OD // this is a chronic - ongoing issue    WC/LH/EES oint and AT's --> reinforced lid hygiene  BID    RE-START ERYTHROMYCIN OINTMENT TO EYE LIDS QHS/PRN --> pt states he is not using this --> educated to re-start       S/P complex - phaco/IOL // + trypan blue and ? Yessica ramirez - had IFIS in other eye // and GDD - ?? ahmed vs Baerveldt - may do better long term with Baerveldt -  GENERAL ANESTHESIA AGAIN -  10/11/17    Phaco / IOL / baerveldt - 250  OS Date: 10/11/2017 / general anesthesia SN60WF 21.50 D  POM 3  - phaco/IOL   Doing well - still with trace to +1 C&F - persistent iritis or pigment   Pred Acetate  - decrease to q day  - slow taper    tube has opened (11/20/2017)    IOP creeping up - not on any glaucoma gtts os  - ?? Steroid response   - restart Timolol OS      F/U 8  weeks - IOP check / iritis chec (still with trace to +1 C&F)  -back on timolol OS - gonio

## 2018-05-11 ENCOUNTER — OFFICE VISIT (OUTPATIENT)
Dept: OPHTHALMOLOGY | Facility: CLINIC | Age: 78
End: 2018-05-11
Payer: MEDICARE

## 2018-05-11 DIAGNOSIS — Z96.1 PSEUDOPHAKIA OF BOTH EYES: ICD-10-CM

## 2018-05-11 DIAGNOSIS — H57.03 SMALL PUPIL: ICD-10-CM

## 2018-05-11 DIAGNOSIS — H40.043 STEROID RESPONDER, BILATERAL: ICD-10-CM

## 2018-05-11 DIAGNOSIS — H40.1133 PRIMARY OPEN ANGLE GLAUCOMA OF BOTH EYES, SEVERE STAGE: Primary | ICD-10-CM

## 2018-05-11 DIAGNOSIS — H21.81 INTRAOPERATIVE FLOPPY IRIS SYNDROME (IFIS): ICD-10-CM

## 2018-05-11 DIAGNOSIS — H20.9 IRITIS OF LEFT EYE: ICD-10-CM

## 2018-05-11 PROCEDURE — 99999 PR PBB SHADOW E&M-EST. PATIENT-LVL II: CPT | Mod: PBBFAC,,, | Performed by: OPHTHALMOLOGY

## 2018-05-11 PROCEDURE — 99212 OFFICE O/P EST SF 10 MIN: CPT | Mod: PBBFAC | Performed by: OPHTHALMOLOGY

## 2018-05-11 PROCEDURE — 92012 INTRM OPH EXAM EST PATIENT: CPT | Mod: S$PBB,,, | Performed by: OPHTHALMOLOGY

## 2018-05-11 RX ORDER — BRINZOLAMIDE 10 MG/ML
1 SUSPENSION/ DROPS OPHTHALMIC 3 TIMES DAILY
Qty: 10 ML | Refills: 12 | Status: SHIPPED | OUTPATIENT
Start: 2018-05-11 | End: 2019-05-23 | Stop reason: SDUPTHER

## 2018-05-11 RX ORDER — TIMOLOL MALEATE 5 MG/ML
1 SOLUTION/ DROPS OPHTHALMIC 2 TIMES DAILY
Qty: 5 ML | Refills: 12 | Status: SHIPPED | OUTPATIENT
Start: 2018-05-11 | End: 2018-07-02

## 2018-05-11 RX ORDER — TERBINAFINE HYDROCHLORIDE 250 MG/1
250 TABLET ORAL DAILY
COMMUNITY
Start: 2018-04-24 | End: 2018-09-17

## 2018-05-11 NOTE — PROGRESS NOTES
"HPI     Glaucoma    Additional comments: IOP ck today           Decreased Visual Acuity    Additional comments: Pt feels vision is "closing down" and getting worse           Iritis    Additional comments: iritis ck            Comments   DLS: 3/20/18    1) Advanced POAG OD>>OS  2) Blepharitis  3) PAS OD  4) PCIOL OU    MEDS:  PA QDAY OS  Timolol BID OU  Azopt TID OD  Latanoprost QHS OD           Last edited by Sharmin Simons MA on 5/11/2018 11:23 AM. (History)            Assessment /Plan     For exam results, see Encounter Report.    Steroid responder, bilateral    Primary open angle glaucoma of both eyes, severe stage  -     brinzolamide (AZOPT) 1 % ophthalmic suspension; Place 1 drop into the right eye 3 (three) times daily.  Dispense: 10 mL; Refill: 12  -     timolol maleate 0.5% (TIMOPTIC) 0.5 % Drop; Place 1 drop into both eyes 2 (two) times daily.  Dispense: 5 mL; Refill: 12    Small pupil    Intraoperative floppy iris syndrome (IFIS)    Iritis of left eye    Pseudophakia of both eyes    Glaucoma shunt device, both eyes          Old pt of  Dr. Joyce ok  C/O epiphora os - s/p probing and irrigation by Isiah - but still persisit     1. POAG (primary open-angle glaucoma) -Advanced   Treated for glaucoma elsewhere x many years  Pt referred in by Dr. Joyce   S/p Fadia ([place]) OD 2011  Prev SLT OD(?)   First HVF  - [facility] 2013   First photos   [facility] - 2013   Treatment / Drops started   Many years ago - elsewhwere           Family history    ?        Glaucoma meds     Timolol ou , latanoprost ou , brimonidine os, dorzolamide os        H/O adverse rxn to glaucoma drops    C/O irritation os with brimonidine and dorzolamide and alphagan P        LASERS    ?        GLAUCOMA SURGERIES    Baerveldt od - still phakic / [place] - Dr. Mahdi Patrick 2/2011         OTHER EYE SURGERIES    Complex phaco/IOL od 3/18/2015, Phaco/IOL with BV OS 10/11/17        CDR    0.9 w/ inf notch /0.8        Tbase    ??          Tmax   "  ??            Ttarget    15/15            HVF    1 outside test 6/15/2012 - Dr. Joyce                   2 [facility] test 2013 - 2014 - SALT / IAD od // early SAD os                   3 2017 OD 24-2 stim V dense SALT/IAD // OS 24-2 ss SAD/IAD        Gonio    +3 S/T/N, PAS I od // +3 S/T/N , PAS I os        CCT    512/494        OCT   3 test 2012 to 2017 - RNFL - OD:dec S/I/N // OS:dec S/I/N        HRT    4 test 2013 to 2018 - MR -  decr S/I od // decr S/N os /// CDR 0.78 od // 0.79 os          Disc photos    2013, 2016  - OIS    - Ttoday    16/14  - Test done today        2. 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  Nuclear sclerosis OU - monitor  - check AR/MR/BAT  BVCA 8/2013 - 20/50 and 20/25  BATh 8/2013 - 20/400 and 20/50    5. ? APD od - re-check          Plan  POAG OD >> OS  S/P glaucoma tube OD  - baerveldt - [place] - Dr. Mahdi Patrick - 2011  Now has insurance again and is seeing Dr. Joyce  Pt has stopped alphagan P - per Dr. Joyce - he was C/O eye irritation and tearing - intolerant     - if IOP goes up OD  - consider a 2nd GDD in the IN quadrant     - S/P phaco/IOL / baerveldt OS - 10/11/2017  (SN60wf - 21.5)    Still with trace C&F - ?? If mostly pigment cells      Glaucoma gtts -   Timolol OU  bid  azopt tid od   latanoprost q hs od    - ?? APD od - difficult to tell - may have a reverse APD   - H/O  narrow angles - too narrow for SLT os - may be more open post phaco    Chronic Blepharitits -- C/O OF Continued TEARING. OS>OD - ongoing issue    WC/LH/EES oint and AT's --> reinforced lid hygiene  BID    RE-START ERYTHROMYCIN OINTMENT TO EYE LIDS QHS/PRN --> pt states he is not using this --> educated to re-start     F/U with HVF 24-2 stim V od and 24-2 ss os // DFE // photos // check AC reaction os - ?? If can try tapering off steroid gtts os // if IOP goes up os can add azopt back       ADDENDUM - 7/2/2018  C/O  DIZZYNESS FROM TIMOLOL  ADJUST GTTS   STOP TIMOLOL  CHANGE AZOPT TO OU TID  CONT LATANOPROST OS ONLY Q HS

## 2018-07-02 ENCOUNTER — TELEPHONE (OUTPATIENT)
Dept: OPHTHALMOLOGY | Facility: CLINIC | Age: 78
End: 2018-07-02

## 2018-07-02 NOTE — TELEPHONE ENCOUNTER
----- Message from Ladan Brito sent at 7/2/2018 12:13 PM CDT -----  Contact: Ulysses  Rx Refill/Request     Is this a Refill or New Rx: Rx change needed for    Rx Name and Strength:  timolol maleate 0.5% (TIMOPTIC) 0.5 % Drop  Preferred Pharmacy with phone number: [facility] - [address]/Phone: [phone] Fax: [phone]  Communication Preference:[phone]  Additional Information: Pt states that the Rx is making him dizzy with nausea .

## 2018-07-02 NOTE — TELEPHONE ENCOUNTER
Pt states that timolol makes him feel very dizzy and nauseated. Pt states that it has always made him feel sick since he started it many years ago. However, pt states that it is getting worse now since he is getting older. Dr. Franklin reviewed notes and will have pt d/c timolol. Pt will begin using the azopt drops 3 times a day on BOTH EYES now and continue latanoprost in left eye only. I gave pt and his daughter instructions and he will call back if he has any other issues.

## 2018-09-17 ENCOUNTER — OFFICE VISIT (OUTPATIENT)
Dept: OPHTHALMOLOGY | Facility: CLINIC | Age: 78
End: 2018-09-17
Payer: MEDICARE

## 2018-09-17 ENCOUNTER — CLINICAL SUPPORT (OUTPATIENT)
Dept: OPHTHALMOLOGY | Facility: CLINIC | Age: 78
End: 2018-09-17
Payer: MEDICARE

## 2018-09-17 DIAGNOSIS — H20.9 IRITIS OF LEFT EYE: ICD-10-CM

## 2018-09-17 DIAGNOSIS — H57.03 SMALL PUPIL: ICD-10-CM

## 2018-09-17 DIAGNOSIS — Z96.1 PSEUDOPHAKIA OF BOTH EYES: ICD-10-CM

## 2018-09-17 DIAGNOSIS — H40.1133 PRIMARY OPEN ANGLE GLAUCOMA OF BOTH EYES, SEVERE STAGE: Primary | ICD-10-CM

## 2018-09-17 DIAGNOSIS — H21.81 INTRAOPERATIVE FLOPPY IRIS SYNDROME (IFIS): ICD-10-CM

## 2018-09-17 DIAGNOSIS — H40.043 STEROID RESPONDER, BILATERAL: ICD-10-CM

## 2018-09-17 PROCEDURE — 99212 OFFICE O/P EST SF 10 MIN: CPT | Mod: PBBFAC,25 | Performed by: OPHTHALMOLOGY

## 2018-09-17 PROCEDURE — 99999 PR PBB SHADOW E&M-EST. PATIENT-LVL II: CPT | Mod: PBBFAC,,, | Performed by: OPHTHALMOLOGY

## 2018-09-17 PROCEDURE — 92083 EXTENDED VISUAL FIELD XM: CPT | Mod: PBBFAC | Performed by: OPHTHALMOLOGY

## 2018-09-17 PROCEDURE — 92014 COMPRE OPH EXAM EST PT 1/>: CPT | Mod: S$PBB,,, | Performed by: OPHTHALMOLOGY

## 2018-09-17 PROCEDURE — 92250 FUNDUS PHOTOGRAPHY W/I&R: CPT | Mod: PBBFAC | Performed by: OPHTHALMOLOGY

## 2018-09-17 RX ORDER — BUSPIRONE HYDROCHLORIDE 7.5 MG/1
7.5 TABLET ORAL 2 TIMES DAILY
Refills: 2 | COMMUNITY
Start: 2018-07-17 | End: 2018-09-17

## 2018-09-17 RX ORDER — TIMOLOL MALEATE 5 MG/ML
1 SOLUTION/ DROPS OPHTHALMIC 2 TIMES DAILY
Refills: 12 | COMMUNITY
Start: 2018-09-04 | End: 2019-05-27

## 2018-09-17 RX ORDER — CYCLOBENZAPRINE HCL 10 MG
TABLET ORAL
Refills: 2 | COMMUNITY
Start: 2018-08-09 | End: 2021-10-07

## 2018-09-17 RX ORDER — SILDENAFIL 50 MG/1
50 TABLET, FILM COATED ORAL DAILY PRN
COMMUNITY
End: 2023-08-15

## 2018-09-17 NOTE — PROGRESS NOTES
HPI     Glaucoma      Additional comments: HVf review today              Eye Pain      Additional comments: Pt c/o intermittent pain OS              Comments     DLS: 5/11/18    1) Advanced POAG OD>>OS  2) Blepharitis  3) PAS OD  4) PCIOL OU    MEDS:  Azopt TID OD  Latanoprost QHS OD  Timolol BID OU  PA QDAY OS            Last edited by Sharmin Simons MA on 9/17/2018 11:06 AM. (History)            Assessment /Plan     For exam results, see Encounter Report.    Primary open angle glaucoma of both eyes, severe stage    Steroid responder, bilateral    Small pupil    Intraoperative floppy iris syndrome (IFIS)    Iritis of left eye    Pseudophakia of both eyes    Glaucoma shunt device, both eyes        Old pt of  Dr. Joyce ok  C/O epiphora os - s/p probing and irrigation by Isiah - but still persisit     1. POAG (primary open-angle glaucoma) -Advanced   Treated for glaucoma elsewhere x many years  Pt referred in by Dr. Joyce   S/p Fadia ([place]) OD 2011  Prev SLT OD(?)   First HVF  - [facility] 2013   First photos   [facility] - 2013   Treatment / Drops started   Many years ago - elsewhwere           Family history    ?        Glaucoma meds     Timolol ou , latanoprost ou , brimonidine os, dorzolamide os        H/O adverse rxn to glaucoma drops    C/O irritation os with brimonidine and dorzolamide and alphagan P        LASERS    ?        GLAUCOMA SURGERIES    Baerveldt od - still phakic / [place] - Dr. Mahdi Patrick 2/2011         OTHER EYE SURGERIES    Complex phaco/IOL od 3/18/2015, Phaco/IOL with BV OS 10/11/17        CDR    0.9 w/ inf notch /0.8        Tbase    ??          Tmax    ??            Ttarget    15/15            HVF    1 outside test 6/15/2012 - Dr. Joyce                   2 [facility] test 2013 - 2014 - SALT / IAD od // early SAD os                   4 2018 OD 24-2 stim V dense SALT/IAD // OS 24-2 ss SAD/IAD        Gonio    +3 S/T/N, PAS I od // +3 S/T/N , PAS I os        CCT    512/494        OCT   3  test 2012 to 2017 - RNFL - OD:dec S/I/N // OS:dec S/I/N        HRT    4 test 2013 to 2018 - MR -  decr S/I od // decr S/N os /// CDR 0.78 od // 0.79 os          Disc photos    2013, 2016, 2018   - OIS    - Ttoday    14/14  - Test done today HVF 24-2 stim V od and 24-2 ss os // DFE // photos        2. 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  Nuclear sclerosis OU - monitor  - check AR/MR/BAT  BVCA 8/2013 - 20/50 and 20/25  BATh 8/2013 - 20/400 and 20/50    5. ? APD od - re-check          Plan  POAG OD >> OS  S/P glaucoma tube OD  - baerveldt - [place] - Dr. Mahdi Patrick - 2011  Now has insurance again and is seeing Dr. Joyce  Pt has stopped alphagan P - per Dr. Joyce - he was C/O eye irritation and tearing - intolerant     - if IOP goes up OD  - consider a 2nd GDD in the IN quadrant     - S/P phaco/IOL / baerveldt OS - 10/11/2017  (SN60wf - 21.5)    Still with trace C&F - ?? If mostly pigment cells      Glaucoma gtts -   Timolol OU  Bid - back on it - it was some pill that was causing the dizziness   azopt tid od   latanoprost q hs od    TRIAL OFF THE PRED ACETATE Q DAY OS     - ?? APD od - difficult to tell - may have a reverse APD   - H/O  narrow angles - too narrow for SLT os - may be more open post phaco    Chronic Blepharitits -- C/O OF Continued TEARING. OS>OD - ongoing issue    WC/LH/EES oint and AT's --> reinforced lid hygiene  BID    RE-START ERYTHROMYCIN OINTMENT TO EYE LIDS QHS/PRN --> pt states he is not using this --> educated to re-start     F/U 4 MONTHS  - with gonio // iritis check os off pred acetate

## 2018-09-17 NOTE — PROGRESS NOTES
Reliable-------Fair--OU  Fixation-------Fair---OU  Coop----------Fair---CAMACHO    24-2 SS HVF Done OU--MARGOTH Franklin

## 2019-01-27 NOTE — PROGRESS NOTES
Assessment /Plan     For exam results, see Encounter Report.    Primary open angle glaucoma of both eyes, severe stage    Steroid responder, bilateral    Small pupil    Intraoperative floppy iris syndrome (IFIS)    Iritis of left eye    Pseudophakia of both eyes    Glaucoma shunt device, both eyes        Old pt of  Dr. Joyce ok  C/O epiphora os - s/p probing and irrigation by [facility] - but still persisit     1. POAG (primary open-angle glaucoma) -Advanced   Treated for glaucoma elsewhere x many years  Pt referred in by Dr. Joyce   S/p Fadia ([place]) OD 2011  Prev SLT OD(?)   [facility]   [facility]   [facility] - 2013   Treatment / Drops started   Many years ago - elsewhwere           Family history    ?        Glaucoma meds     Timolol ou , latanoprost ou , brimonidine os, dorzolamide os        H/O adverse rxn to glaucoma drops    C/O irritation os with brimonidine and dorzolamide and alphagan P        LASERS    ?        GLAUCOMA SURGERIES    Baerveldt od - still phakic / [place] - Dr. Mahdi Patrick 2/2011         OTHER EYE SURGERIES    Complex phaco/IOL od 3/18/2015, Phaco/IOL with BV OS 10/11/17        CDR    0.9 w/ inf notch /0.8        Tbase    ??          Tmax    ??            Ttarget    15/15            HVF    1 outside test 6/15/2012 - Dr. [facility]                   [facility] // early SAD os                   4 2018 OD 24-2 stim V dense SALT/IAD // OS 24-2 ss SAD/IAD        Gonio    +3 S/T/N, PAS I od // +3 S/T/N , PAS I os        CCT    512/494        OCT   3 test 2012 to 2017 - RNFL - OD:dec S/I/N // OS:dec S/I/N        HRT    4 test 2013 to 2018 - MR -  decr S/I od // decr S/N os /// CDR 0.78 od // 0.79 os          Disc photos    2013, 2016, 2018   - OIS    - Ttoday    14/15  - Test done today  -   / iritis check        2. Blepharitis - WC/LH/EES/AT   3. Posterior Synechiae OD   - 2/2 Fadia surgery  - now with 360 PS and a small pupil   4.   Nuclear sclerosis OU - monitor  - check AR/MR/BAT  BVCA 8/2013 - 20/50 and 20/25  BATh 8/2013 - 20/400 and 20/50    5. ? APD od - re-check          Plan  POAG OD >> OS  S/P glaucoma tube OD  - baerveldt - [place] - Dr. Mahdi Patrick - 2011  Now has insurance again and is seeing Dr. Joyce  Pt has stopped alphagan P - per Dr. Joyce - he was C/O eye irritation and tearing - intolerant     - if IOP goes up OD  - consider a 2nd GDD in the IN quadrant     - S/P phaco/IOL / baerveldt OS - 10/11/2017  (SN60wf - 21.5)    Still with trace C&F - ?? If mostly pigment cells      Glaucoma gtts -   Timolol OU  Bid - back on it - it was some pill that was causing the dizziness   azopt tid od   latanoprost q hs od    TRIAL OFF THE PRED ACETATE Q DAY OS     - ?? APD od - difficult to tell - may have a reverse APD   - H/O  narrow angles - too narrow for SLT os - may be more open post phaco    Chronic Blepharitits -- C/O OF Continued TEARING. OS>OD - ongoing issue    WC/LH/EES oint and AT's --> reinforced lid hygiene  BID    RE-START ERYTHROMYCIN OINTMENT TO EYE LIDS QHS/PRN --> pt states he is not using this --> educated to re-start     F/U 4 MONTHS  - HRT / gonio - iritis check - off all steroids now

## 2019-01-28 ENCOUNTER — OFFICE VISIT (OUTPATIENT)
Dept: OPHTHALMOLOGY | Facility: CLINIC | Age: 79
End: 2019-01-28
Payer: MEDICARE

## 2019-01-28 DIAGNOSIS — H40.043 STEROID RESPONDER, BILATERAL: ICD-10-CM

## 2019-01-28 DIAGNOSIS — H40.1133 PRIMARY OPEN ANGLE GLAUCOMA OF BOTH EYES, SEVERE STAGE: Primary | ICD-10-CM

## 2019-01-28 DIAGNOSIS — H57.03 SMALL PUPIL: ICD-10-CM

## 2019-01-28 DIAGNOSIS — H21.81 INTRAOPERATIVE FLOPPY IRIS SYNDROME (IFIS): ICD-10-CM

## 2019-01-28 DIAGNOSIS — H20.9 IRITIS OF LEFT EYE: ICD-10-CM

## 2019-01-28 DIAGNOSIS — Z96.1 PSEUDOPHAKIA OF BOTH EYES: ICD-10-CM

## 2019-01-28 PROCEDURE — 92012 PR EYE EXAM, EST PATIENT,INTERMED: ICD-10-PCS | Mod: S$PBB,,, | Performed by: OPHTHALMOLOGY

## 2019-01-28 PROCEDURE — 99999 PR PBB SHADOW E&M-EST. PATIENT-LVL II: ICD-10-PCS | Mod: PBBFAC,,, | Performed by: OPHTHALMOLOGY

## 2019-01-28 PROCEDURE — 99212 OFFICE O/P EST SF 10 MIN: CPT | Mod: PBBFAC | Performed by: OPHTHALMOLOGY

## 2019-01-28 PROCEDURE — 92012 INTRM OPH EXAM EST PATIENT: CPT | Mod: S$PBB,,, | Performed by: OPHTHALMOLOGY

## 2019-01-28 PROCEDURE — 99999 PR PBB SHADOW E&M-EST. PATIENT-LVL II: CPT | Mod: PBBFAC,,, | Performed by: OPHTHALMOLOGY

## 2019-01-28 RX ORDER — NIFEDIPINE 60 MG/1
60 TABLET, EXTENDED RELEASE ORAL DAILY
Refills: 3 | COMMUNITY
Start: 2019-01-14 | End: 2023-08-15

## 2019-01-28 RX ORDER — AMOXICILLIN 500 MG
2 CAPSULE ORAL DAILY
COMMUNITY
End: 2021-10-07

## 2019-01-28 RX ORDER — PANTOPRAZOLE SODIUM 40 MG/1
40 TABLET, DELAYED RELEASE ORAL DAILY
Refills: 11 | COMMUNITY
Start: 2018-10-24 | End: 2021-10-07 | Stop reason: ALTCHOICE

## 2019-03-27 DIAGNOSIS — H40.1133 PRIMARY OPEN ANGLE GLAUCOMA OF BOTH EYES, SEVERE STAGE: ICD-10-CM

## 2019-03-27 RX ORDER — LATANOPROST 50 UG/ML
1 SOLUTION/ DROPS OPHTHALMIC NIGHTLY
Qty: 2.5 ML | Refills: 12 | Status: SHIPPED | OUTPATIENT
Start: 2019-03-27 | End: 2019-05-27 | Stop reason: SDUPTHER

## 2019-05-23 DIAGNOSIS — H40.1133 PRIMARY OPEN ANGLE GLAUCOMA OF BOTH EYES, SEVERE STAGE: ICD-10-CM

## 2019-05-23 RX ORDER — BRINZOLAMIDE 10 MG/ML
SUSPENSION/ DROPS OPHTHALMIC
Qty: 10 ML | Refills: 12 | Status: SHIPPED | OUTPATIENT
Start: 2019-05-23 | End: 2019-05-27 | Stop reason: SDUPTHER

## 2019-05-23 RX ORDER — TIMOLOL MALEATE 5 MG/ML
SOLUTION/ DROPS OPHTHALMIC
Qty: 10 ML | Refills: 12 | Status: SHIPPED | OUTPATIENT
Start: 2019-05-23 | End: 2019-05-27 | Stop reason: SDUPTHER

## 2019-05-27 ENCOUNTER — OFFICE VISIT (OUTPATIENT)
Dept: OPHTHALMOLOGY | Facility: CLINIC | Age: 79
End: 2019-05-27
Payer: MEDICARE

## 2019-05-27 DIAGNOSIS — H57.03 SMALL PUPIL: ICD-10-CM

## 2019-05-27 DIAGNOSIS — H40.043 STEROID RESPONDER, BILATERAL: ICD-10-CM

## 2019-05-27 DIAGNOSIS — Z96.1 PSEUDOPHAKIA OF BOTH EYES: ICD-10-CM

## 2019-05-27 DIAGNOSIS — H40.1133 PRIMARY OPEN ANGLE GLAUCOMA OF BOTH EYES, SEVERE STAGE: Primary | ICD-10-CM

## 2019-05-27 DIAGNOSIS — H21.81 INTRAOPERATIVE FLOPPY IRIS SYNDROME (IFIS): ICD-10-CM

## 2019-05-27 DIAGNOSIS — H20.9 IRITIS OF LEFT EYE: ICD-10-CM

## 2019-05-27 PROCEDURE — 92133 HEIDELBERG RETINA TOMOGRAPHY (HRT) - OU - BOTH EYES: ICD-10-PCS | Mod: 26,S$PBB,, | Performed by: OPHTHALMOLOGY

## 2019-05-27 PROCEDURE — 92012 PR EYE EXAM, EST PATIENT,INTERMED: ICD-10-PCS | Mod: S$PBB,,, | Performed by: OPHTHALMOLOGY

## 2019-05-27 PROCEDURE — 99999 PR PBB SHADOW E&M-EST. PATIENT-LVL II: CPT | Mod: PBBFAC,,, | Performed by: OPHTHALMOLOGY

## 2019-05-27 PROCEDURE — 99999 PR PBB SHADOW E&M-EST. PATIENT-LVL II: ICD-10-PCS | Mod: PBBFAC,,, | Performed by: OPHTHALMOLOGY

## 2019-05-27 PROCEDURE — 92133 CPTRZD OPH DX IMG PST SGM ON: CPT | Mod: PBBFAC | Performed by: OPHTHALMOLOGY

## 2019-05-27 PROCEDURE — 92012 INTRM OPH EXAM EST PATIENT: CPT | Mod: S$PBB,,, | Performed by: OPHTHALMOLOGY

## 2019-05-27 PROCEDURE — 99212 OFFICE O/P EST SF 10 MIN: CPT | Mod: PBBFAC | Performed by: OPHTHALMOLOGY

## 2019-05-27 RX ORDER — CLONIDINE HYDROCHLORIDE 0.1 MG/1
0.1 TABLET ORAL DAILY
Refills: 2 | COMMUNITY
Start: 2019-03-11 | End: 2021-10-07

## 2019-05-27 RX ORDER — TIMOLOL MALEATE 5 MG/ML
1 SOLUTION/ DROPS OPHTHALMIC 2 TIMES DAILY
Qty: 10 ML | Refills: 12 | Status: SHIPPED | OUTPATIENT
Start: 2019-05-27 | End: 2020-06-04 | Stop reason: SDUPTHER

## 2019-05-27 RX ORDER — BRINZOLAMIDE 10 MG/ML
1 SUSPENSION/ DROPS OPHTHALMIC 3 TIMES DAILY
Qty: 10 ML | Refills: 12 | Status: SHIPPED | OUTPATIENT
Start: 2019-05-27 | End: 2020-06-04 | Stop reason: SDUPTHER

## 2019-05-27 RX ORDER — MELOXICAM 15 MG/1
15 TABLET ORAL DAILY
COMMUNITY
Start: 2019-04-30 | End: 2021-10-07

## 2019-05-27 RX ORDER — LATANOPROST 50 UG/ML
1 SOLUTION/ DROPS OPHTHALMIC NIGHTLY
Qty: 2.5 ML | Refills: 12 | Status: SHIPPED | OUTPATIENT
Start: 2019-05-27 | End: 2020-06-04 | Stop reason: SDUPTHER

## 2019-05-27 NOTE — PROGRESS NOTES
HPI     Glaucoma      Additional comments: HRT review today              Decreased Visual Acuity      Additional comments: Pt states that his vision seems to be getting   darker               Eye Pain      Additional comments: Pt c/o occasional pain and stickiness OS               Comments     DLS: 1/25/19  -Pt states that his vision is getting darker and he is having difficulty   adjusting from light to darker rooms.     1) Advanced POAG OD>>OS  2) Blepharitis  3) PAS OD  4) PCIOL OU    MEDS:  Timolol BID OU  Azopt TID OD   Latanoprost QHS OD                Last edited by Sharmin Simons MA on 5/27/2019 10:54 AM. (History)            Assessment /Plan     For exam results, see Encounter Report.    Primary open angle glaucoma of both eyes, severe stage    Steroid responder, bilateral    Small pupil    Intraoperative floppy iris syndrome (IFIS)    Iritis of left eye    Pseudophakia of both eyes    Glaucoma shunt device, both eyes            Old pt of  Dr. Joyce ok  C/O epiphora os - s/p probing and irrigation by Isiah - but still persisit     1. POAG (primary open-angle glaucoma) -Advanced   Treated for glaucoma elsewhere x many years  Pt referred in by Dr. Joyce   S/p Fadia ([place]) OD 2011  Prev SLT OD(?)   [facility]  [facility]   First photos   [facility] - 2013   Treatment / Drops started   Many years ago - elsewhwere           Family history    ?        Glaucoma meds     Timolol ou , latanoprost ou , brimonidine os, dorzolamide os        H/O adverse rxn to glaucoma drops    C/O irritation os with brimonidine and dorzolamide and alphagan P        LASERS    ?        GLAUCOMA SURGERIES    Baerveldt od - still phakic / [place] - Dr. Mahdi Patrick 2/2011         OTHER EYE SURGERIES    Complex phaco/IOL od 3/18/2015, Phaco/IOL with BV OS 10/11/17        CDR    0.9 w/ inf notch /0.8        Tbase    ??          Tmax    ??            Ttarget    15/15            HVF    1 outside test 6/15/2012 - Dr. Joyce                    2 [facility] test 2013 - 2014 - SALT / IAD od // early SAD os                   4 2018 OD 24-2 stim V dense SALT/IAD // OS 24-2 ss SAD/IAD        Gonio    +3 S/T/N, PAS I od // +3 S/T/N , PAS I os        CCT    512/494        OCT   3 test 2012 to 2017 - RNFL - OD:dec S/I/N // OS:dec S/I/N        HRT    5 test 2013 to 2019 - MR -  Dec. S/I, bord N od // dec. S/N/I, bord T os /// CDR 0.78 od // 0.79 os          Disc photos    2013, 2016, 2018   - OIS    - Ttoday    14/17  - Test done today  -    HRT / iritis check       2. 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  Nuclear sclerosis OU - monitor  - check AR/MR/BAT  BVCA 8/2013 - 20/50 and 20/25  BATh 8/2013 - 20/400 and 20/50    5. ? APD od - re-check          Plan  POAG OD >> OS  S/P glaucoma tube OD  - baerveldt - [place] - Dr. Mahdi Patrick - 2011  Now has insurance again and is seeing Dr. Joyce  Pt has stopped alphagan P - per Dr. Joyce - he was C/O eye irritation and tearing - intolerant     - if IOP goes up OD  - consider a 2nd GDD in the IN quadrant     - S/P phaco/IOL / baerveldt OS - 10/11/2017  (SN60wf - 21.5)    Still with trace C&F - ?? If mostly pigment cells      Glaucoma gtts -   Timolol OU  Bid - back on it - it was some pill that was causing the dizziness   azopt tid od - change to OU (5/27/2019)   latanoprost q hs od - cont od only - had a prolonged iritis post op     OFF pred acetate     - ?? APD od - difficult to tell - may have a reverse APD   - H/O  narrow angles - too narrow for SLT os - may be more open post phaco    Chronic Blepharitits -- C/O OF Continued TEARING. OS>OD - ongoing issue    WC/LH/EES oint and AT's --> reinforced lid hygiene  BID    RE-START ERYTHROMYCIN OINTMENT TO EYE LIDS QHS/PRN --> pt states he is not using this --> educated to re-start     F/U 4 MONTHS  -  HVF / DFE / OCT

## 2019-09-25 NOTE — PROGRESS NOTES
HPI     Glaucoma      Additional comments: HVF and OCT review today              Comments     DLS: 5/27/19    1) POAG OD>>OS  2) Blepharitis  3) PAS OD  4) PCIOL OU  5) ? APD OD    MEDS:  Timolol BID OU  Azopt TID OD   Latanoprost QHS OD  EES antoni QHS OU              Last edited by Sharmin Simons MA on 9/27/2019 11:40 AM. (History)              Assessment /Plan     For exam results, see Encounter Report.    Primary open angle glaucoma of both eyes, severe stage    Steroid responder, bilateral    Small pupil    Intraoperative floppy iris syndrome (IFIS)    Iritis of left eye    Pseudophakia of both eyes    Glaucoma shunt device, both eyes      Old pt of  Dr. Joyce ok  C/O epiphora os - s/p probing and irrigation by [facility] - but still persisit     1. POAG (primary open-angle glaucoma) -Advanced   Treated for glaucoma elsewhere x many years  Pt referred in by Dr. Joyce   S/p Fadia ([place]) OD 2011  Prev SLT OD(?)   First HVF  - [facility] 2013   First photos   [facility] - 2013   Treatment / Drops started   Many years ago - elsewhwere           Family history    ?        Glaucoma meds     Timolol ou , latanoprost ou , brimonidine os, dorzolamide os        H/O adverse rxn to glaucoma drops    C/O irritation os with brimonidine and dorzolamide and alphagan P        LASERS    ?        GLAUCOMA SURGERIES    Baerveldt od - still phakic / [place] - Dr. Mahdi Patrick 2/2011         OTHER EYE SURGERIES    Complex phaco/IOL od 3/18/2015, Phaco/IOL with BV OS 10/11/17        CDR    0.9 w/ inf notch /0.8        Tbase    ??          Tmax    ??            Ttarget    15/15            HVF    1 outside test 6/15/2012 - Dr. Joyce                   2 [facility] test 2013 - 2014 - SALT / IAD od // early SAD os                   5 2018 OD 24-2 stim V dense SALT/IAD // OS 24-2 ss SAD/IAD        Gonio    +3 S/T/N, PAS I od // +3 S/T/N , PAS I os        CCT    512/494        OCT   4 test 2012 to 2019 - RNFL - OD:dec S/I/N // OS:dec S/I/N         HRT    5 test 2013 to 2019 - MR -  Dec. S/I, bord N od // dec. S/N/I, bord T os /// CDR 0.78 od // 0.79 os          Disc photos    2013, 2016, 2018   - OIS    - Ttoday    14/14  - Test done today  -   HVF / DFE / OCT       2. 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  Nuclear sclerosis OU - monitor  - check AR/MR/BAT  BVCA 8/2013 - 20/50 and 20/25  BATh 8/2013 - 20/400 and 20/50    5. ? APD od - re-check          Plan  POAG OD >> OS  S/P glaucoma tube OD  - baerveldt - [place] - Dr. Mahdi Patrick - 2011  Now has insurance again and is seeing Dr. Joyce  Pt has stopped alphagan P - per Dr. Joyce - he was C/O eye irritation and tearing - intolerant     - if IOP goes up OD  - consider a 2nd GDD in the IN quadrant     - S/P phaco/IOL / baerveldt OS - 10/11/2017  (SN60wf - 21.5)    Still with trace C&F - ?? If mostly pigment cells      Glaucoma gtts -   Timolol OU  Bid - back on it - it was some pill that was causing the dizziness   azopt tid od - change to OU (5/27/2019)   latanoprost q hs od - cont od only - had a prolonged iritis post op     OFF pred acetate     - ?? APD od - difficult to tell - may have a reverse APD   - H/O  narrow angles - too narrow for SLT os - may be more open post phaco    Chronic Blepharitits -- C/O OF Continued TEARING. OS>OD - ongoing issue    WC/LH/EES oint and AT's --> reinforced lid hygiene  BID    RE-START ERYTHROMYCIN OINTMENT TO EYE LIDS QHS/PRN --> pt states he is not using this --> educated to re-start     F/U 4 MONTHS  -  Gonio

## 2019-09-27 ENCOUNTER — CLINICAL SUPPORT (OUTPATIENT)
Dept: OPHTHALMOLOGY | Facility: CLINIC | Age: 79
End: 2019-09-27
Payer: MEDICARE

## 2019-09-27 ENCOUNTER — OFFICE VISIT (OUTPATIENT)
Dept: OPHTHALMOLOGY | Facility: CLINIC | Age: 79
End: 2019-09-27
Payer: MEDICARE

## 2019-09-27 DIAGNOSIS — H40.043 STEROID RESPONDER, BILATERAL: ICD-10-CM

## 2019-09-27 DIAGNOSIS — H57.03 SMALL PUPIL: ICD-10-CM

## 2019-09-27 DIAGNOSIS — H21.81 INTRAOPERATIVE FLOPPY IRIS SYNDROME (IFIS): ICD-10-CM

## 2019-09-27 DIAGNOSIS — H40.1133 PRIMARY OPEN ANGLE GLAUCOMA OF BOTH EYES, SEVERE STAGE: ICD-10-CM

## 2019-09-27 DIAGNOSIS — H40.1133 PRIMARY OPEN ANGLE GLAUCOMA OF BOTH EYES, SEVERE STAGE: Primary | ICD-10-CM

## 2019-09-27 DIAGNOSIS — Z96.1 PSEUDOPHAKIA OF BOTH EYES: ICD-10-CM

## 2019-09-27 DIAGNOSIS — H20.9 IRITIS OF LEFT EYE: ICD-10-CM

## 2019-09-27 PROCEDURE — 92083 HUMPHREY VISUAL FIELD - OU - BOTH EYES: ICD-10-PCS | Mod: 26,S$PBB,, | Performed by: OPHTHALMOLOGY

## 2019-09-27 PROCEDURE — 99212 OFFICE O/P EST SF 10 MIN: CPT | Mod: PBBFAC,25 | Performed by: OPHTHALMOLOGY

## 2019-09-27 PROCEDURE — 92083 EXTENDED VISUAL FIELD XM: CPT | Mod: 26,S$PBB,, | Performed by: OPHTHALMOLOGY

## 2019-09-27 PROCEDURE — 92133 POSTERIOR SEGMENT OCT OPTIC NERVE(OCULAR COHERENCE TOMOGRAPHY) - OU - BOTH EYES: ICD-10-PCS | Mod: 26,S$PBB,, | Performed by: OPHTHALMOLOGY

## 2019-09-27 PROCEDURE — 99999 PR PBB SHADOW E&M-EST. PATIENT-LVL II: CPT | Mod: PBBFAC,,, | Performed by: OPHTHALMOLOGY

## 2019-09-27 PROCEDURE — 92014 PR EYE EXAM, EST PATIENT,COMPREHESV: ICD-10-PCS | Mod: S$PBB,,, | Performed by: OPHTHALMOLOGY

## 2019-09-27 PROCEDURE — 92014 COMPRE OPH EXAM EST PT 1/>: CPT | Mod: S$PBB,,, | Performed by: OPHTHALMOLOGY

## 2019-09-27 PROCEDURE — 92083 EXTENDED VISUAL FIELD XM: CPT | Mod: PBBFAC

## 2019-09-27 PROCEDURE — 99999 PR PBB SHADOW E&M-EST. PATIENT-LVL II: ICD-10-PCS | Mod: PBBFAC,,, | Performed by: OPHTHALMOLOGY

## 2019-09-27 PROCEDURE — 92133 CPTRZD OPH DX IMG PST SGM ON: CPT | Mod: PBBFAC | Performed by: OPHTHALMOLOGY

## 2019-09-27 RX ORDER — HYDROCODONE BITARTRATE AND ACETAMINOPHEN 5; 325 MG/1; MG/1
TABLET ORAL
Refills: 0 | COMMUNITY
Start: 2019-09-20 | End: 2023-08-15

## 2019-09-27 RX ORDER — OMEPRAZOLE 10 MG/1
10 CAPSULE, DELAYED RELEASE ORAL DAILY
COMMUNITY

## 2019-09-27 RX ORDER — FLUTICASONE PROPIONATE 50 MCG
1 SPRAY, SUSPENSION (ML) NASAL DAILY
Refills: 0 | COMMUNITY
Start: 2019-08-14

## 2020-01-08 NOTE — PROGRESS NOTES
HPI     Glaucoma      Additional comments: 4 month ck               Comments     DLS: 5/27/19    1) POAG OD>>OS  2) Blepharitis  3) PAS OD  4) PCIOL OU  5) ? APD OD    MEDS:  Timolol BID OU  Azopt TID OU  Latanoprost QHS OD  EES antoni QHS OU              Last edited by Sharmin Simons MA on 1/10/2020 11:11 AM. (History)            Assessment /Plan     For exam results, see Encounter Report.    Primary open angle glaucoma of both eyes, severe stage    Steroid responder, bilateral    Small pupil    Intraoperative floppy iris syndrome (IFIS)    Iritis of left eye    Pseudophakia of both eyes    Glaucoma shunt device, both eyes      Old pt of  Dr. Joyce ok  C/O epiphora os - s/p probing and irrigation by [facility] - but still persisit     1. POAG (primary open-angle glaucoma) -Advanced   Treated for glaucoma elsewhere x many years  Pt referred in by Dr. Joyce   S/p Fadia ([place]) OD 2011  Prev SLT OD(?)   First HVF  - [facility] 2013   First photos   [facility] - 2013   Treatment / Drops started   Many years ago - elsewhwere           Family history    ?        Glaucoma meds     Timolol ou , latanoprost ou , brimonidine os, dorzolamide os        H/O adverse rxn to glaucoma drops    C/O irritation os with brimonidine and dorzolamide and alphagan P        LASERS    ?        GLAUCOMA SURGERIES    Baerveldt od - still phakic / [place] - Dr. Mahdi Patrick 2/2011         OTHER EYE SURGERIES    Complex phaco/IOL od 3/18/2015, Phaco/IOL with BV OS 10/11/17        CDR    0.9 w/ inf notch /0.8        Tbase    ??          Tmax    ??            Ttarget    16/16          HVF    1 outside test 6/15/2012 - Dr. Joyce                   2 [facility] test 2013 - 2014 - SALT / IAD od // early SAD os                   5 2018 OD 24-2 stim V dense SALT/IAD // OS 24-2 ss SAD/IAD        Gonio    +3 S/T/N, PAS I od // +3 S/T/N , PAS I os        CCT    512/494        OCT   4 test 2012 to 2019 - RNFL - OD:dec S/I/N // OS:dec S/I/N        HRT    5 test  2013 to 2019 - MR -  Dec. S/I, bord N od // dec. S/N/I, bord T os /// CDR 0.78 od // 0.79 os          Disc photos    2013, 2016, 2018   - OIS    - Ttoday   16/14  - Test done today  -  Gonio       2. 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  Nuclear sclerosis OU - monitor  - check AR/MR/BAT  BVCA 8/2013 - 20/50 and 20/25  BATh 8/2013 - 20/400 and 20/50    5. ? APD od - re-check          Plan  POAG OD >> OS  S/P glaucoma tube OD  - baerveldt - [place] - Dr. Mahdi Patrick - 2011  Now has insurance again and is seeing Dr. Joyce  Pt has stopped alphagan P - per Dr. Joyce - he was C/O eye irritation and tearing - intolerant     - if IOP goes up OD  - consider a 2nd GDD in the IN quadrant     - S/P phaco/IOL / baerveldt OS - 10/11/2017  (SN60wf - 21.5)    Still with trace C&F - ?? If mostly pigment cells      Glaucoma gtts -   Timolol OU  Bid - back on it - it was some pill that was causing the dizziness   azopt tid od - change to OU (5/27/2019)   latanoprost q hs od - cont od only - had a prolonged iritis post op     OFF pred acetate     - ?? APD od - difficult to tell - may have a reverse APD   - H/O  narrow angles - too narrow for SLT os - may be more open post phaco    Chronic Blepharitits -- C/O OF Continued TEARING. OS>OD - ongoing issue    WC/LH/EES oint and AT's --> reinforced lid hygiene  BID    RE-START ERYTHROMYCIN OINTMENT TO EYE LIDS QHS/PRN --> pt states he is not using this --> educated to re-start     F/U 4 MONTHS  -  HRT

## 2020-01-10 ENCOUNTER — OFFICE VISIT (OUTPATIENT)
Dept: OPHTHALMOLOGY | Facility: CLINIC | Age: 80
End: 2020-01-10
Payer: MEDICARE

## 2020-01-10 DIAGNOSIS — Z96.1 PSEUDOPHAKIA OF BOTH EYES: ICD-10-CM

## 2020-01-10 DIAGNOSIS — H21.81 INTRAOPERATIVE FLOPPY IRIS SYNDROME (IFIS): ICD-10-CM

## 2020-01-10 DIAGNOSIS — H40.1133 PRIMARY OPEN ANGLE GLAUCOMA OF BOTH EYES, SEVERE STAGE: Primary | ICD-10-CM

## 2020-01-10 DIAGNOSIS — H20.9 IRITIS OF LEFT EYE: ICD-10-CM

## 2020-01-10 DIAGNOSIS — H40.043 STEROID RESPONDER, BILATERAL: ICD-10-CM

## 2020-01-10 DIAGNOSIS — H57.03 SMALL PUPIL: ICD-10-CM

## 2020-01-10 PROCEDURE — 92012 INTRM OPH EXAM EST PATIENT: CPT | Mod: S$PBB,,, | Performed by: OPHTHALMOLOGY

## 2020-01-10 PROCEDURE — 99999 PR PBB SHADOW E&M-EST. PATIENT-LVL II: ICD-10-PCS | Mod: PBBFAC,,, | Performed by: OPHTHALMOLOGY

## 2020-01-10 PROCEDURE — 92012 PR EYE EXAM, EST PATIENT,INTERMED: ICD-10-PCS | Mod: S$PBB,,, | Performed by: OPHTHALMOLOGY

## 2020-01-10 PROCEDURE — 99212 OFFICE O/P EST SF 10 MIN: CPT | Mod: PBBFAC | Performed by: OPHTHALMOLOGY

## 2020-01-10 PROCEDURE — 99999 PR PBB SHADOW E&M-EST. PATIENT-LVL II: CPT | Mod: PBBFAC,,, | Performed by: OPHTHALMOLOGY

## 2020-06-04 DIAGNOSIS — H40.1133 PRIMARY OPEN ANGLE GLAUCOMA OF BOTH EYES, SEVERE STAGE: ICD-10-CM

## 2020-06-04 RX ORDER — LATANOPROST 50 UG/ML
1 SOLUTION/ DROPS OPHTHALMIC NIGHTLY
Qty: 2.5 ML | Refills: 12 | Status: SHIPPED | OUTPATIENT
Start: 2020-06-04 | End: 2021-04-19 | Stop reason: SDUPTHER

## 2020-06-04 RX ORDER — TIMOLOL MALEATE 5 MG/ML
1 SOLUTION/ DROPS OPHTHALMIC 2 TIMES DAILY
Qty: 10 ML | Refills: 12 | Status: SHIPPED | OUTPATIENT
Start: 2020-06-04 | End: 2021-04-19 | Stop reason: SDUPTHER

## 2020-06-04 RX ORDER — BRINZOLAMIDE 10 MG/ML
1 SUSPENSION/ DROPS OPHTHALMIC 3 TIMES DAILY
Qty: 10 ML | Refills: 12 | Status: SHIPPED | OUTPATIENT
Start: 2020-06-04 | End: 2021-04-19 | Stop reason: SDUPTHER

## 2020-08-03 ENCOUNTER — OFFICE VISIT (OUTPATIENT)
Dept: OPHTHALMOLOGY | Facility: CLINIC | Age: 80
End: 2020-08-03
Payer: MEDICARE

## 2020-08-03 DIAGNOSIS — H40.043 STEROID RESPONDER, BILATERAL: ICD-10-CM

## 2020-08-03 DIAGNOSIS — Z96.1 PSEUDOPHAKIA OF BOTH EYES: ICD-10-CM

## 2020-08-03 DIAGNOSIS — H20.9 IRITIS OF LEFT EYE: ICD-10-CM

## 2020-08-03 DIAGNOSIS — H02.889 MEIBOMIAN GLAND DYSFUNCTION: ICD-10-CM

## 2020-08-03 DIAGNOSIS — H21.81 INTRAOPERATIVE FLOPPY IRIS SYNDROME (IFIS): ICD-10-CM

## 2020-08-03 DIAGNOSIS — H40.1133 PRIMARY OPEN ANGLE GLAUCOMA OF BOTH EYES, SEVERE STAGE: Primary | ICD-10-CM

## 2020-08-03 DIAGNOSIS — H57.03 SMALL PUPIL: ICD-10-CM

## 2020-08-03 PROCEDURE — 92133 HEIDELBERG RETINA TOMOGRAPHY (HRT) - OU - BOTH EYES: ICD-10-PCS | Mod: 26,S$PBB,, | Performed by: OPHTHALMOLOGY

## 2020-08-03 PROCEDURE — 99213 OFFICE O/P EST LOW 20 MIN: CPT | Mod: PBBFAC | Performed by: OPHTHALMOLOGY

## 2020-08-03 PROCEDURE — 92012 INTRM OPH EXAM EST PATIENT: CPT | Mod: S$PBB,,, | Performed by: OPHTHALMOLOGY

## 2020-08-03 PROCEDURE — 99999 PR PBB SHADOW E&M-EST. PATIENT-LVL III: ICD-10-PCS | Mod: PBBFAC,,, | Performed by: OPHTHALMOLOGY

## 2020-08-03 PROCEDURE — 92012 PR EYE EXAM, EST PATIENT,INTERMED: ICD-10-PCS | Mod: S$PBB,,, | Performed by: OPHTHALMOLOGY

## 2020-08-03 PROCEDURE — 92133 CPTRZD OPH DX IMG PST SGM ON: CPT | Mod: PBBFAC | Performed by: OPHTHALMOLOGY

## 2020-08-03 PROCEDURE — 99999 PR PBB SHADOW E&M-EST. PATIENT-LVL III: CPT | Mod: PBBFAC,,, | Performed by: OPHTHALMOLOGY

## 2020-08-03 RX ORDER — DOXYCYCLINE 100 MG/1
100 CAPSULE ORAL 2 TIMES DAILY
Qty: 60 CAPSULE | Refills: 3 | Status: SHIPPED | OUTPATIENT
Start: 2020-08-03 | End: 2021-10-07

## 2020-08-03 RX ORDER — ERYTHROMYCIN 5 MG/G
OINTMENT OPHTHALMIC NIGHTLY
Qty: 1 TUBE | Refills: 12 | Status: SHIPPED | OUTPATIENT
Start: 2020-08-03

## 2020-08-03 NOTE — PROGRESS NOTES
HPI     DLS: 1/10/20    Pt here for HRT review;  Pt states OS keeps tearing up.   Pt reports OS crusty in the morning and the eyes hurt all the time. He   wants to know if there is any pill that he can take. He feels like vision   keeps getting worse.       Meds:   Timolol BID OU   Azopt TID OU   Latanoprost QHS OD   EES antoni QHS OU     1) POAG OD>>OS   2) Blepharitis   3) PAS OD   4) PCIOL OU   5) ? APD OD     Last edited by Rae Valle MD on 8/3/2020 12:27 PM. (History)              Assessment /Plan     For exam results, see Encounter Report.    Primary open angle glaucoma of both eyes, severe stage  -     [place] [facility]ina Tomography (HRT) - OU - Both Eyes    Steroid responder, bilateral    Small pupil    Intraoperative floppy iris syndrome (IFIS)    Iritis of left eye    Pseudophakia of both eyes    Glaucoma shunt device, both eyes    Meibomian gland dysfunction  -     doxycycline (MONODOX) 100 MG capsule; Take 1 capsule (100 mg total) by mouth 2 (two) times daily. For one month, then change to once a day  Dispense: 60 capsule; Refill: 3      yes      Old pt of  Dr. Joyce ok  C/O epiphora os - s/p probing and irrigation by [facility]iah - but still persisit     1. POAG (primary open-angle glaucoma) -Advanced   Treated for glaucoma elsewhere x many years  Pt referred in by Dr. Joyce   S/p Fadia ([place]) OD 2011  Prev SLT OD(?)   First HVF  - [facility] 2013   First photos   [facility] - 2013   Treatment / Drops started   Many years ago - elsewhwere           Family history    ?        Glaucoma meds     Timolol ou , latanoprost ou , brimonidine os, dorzolamide os        H/O adverse rxn to glaucoma drops    C/O irritation os with brimonidine and dorzolamide and alphagan P        LASERS    ?        GLAUCOMA SURGERIES    Baerveldt od - still phakic / [place] - Dr. Mahdi Patrick 2/2011         OTHER EYE SURGERIES    Complex phaco/IOL od 3/18/2015, Phaco/IOL with BV OS 10/11/17        CDR    0.9 w/ inf notch /0.8         Tbase    ??          Tmax    ??            Ttarget    16/16          HVF    1 outside test 6/15/2012 - Dr. Joyce                   2 [facility] test 2013 - 2014 - SALT / IAD od // early SAD os                   5 2018 OD 24-2 stim V dense SALT/IAD // OS 24-2 ss SAD/IAD        Gonio    +3 S/T/N, PAS I od // +3 S/T/N , PAS I os        CCT    512/494        OCT   4 test 2012 to 2019 - RNFL - OD:dec S/I/N // OS:dec S/I/N        HRT    6 test 2013 to 2020 - MR -  MR -  Dec SN/IN/IT border ST od // dec N/SN border ST/T/TI os /// CDR                       0.757 od // 0.788 os       Disc photos    2013, 2016, 2018   - OIS    - Ttoday   12/14  - Test done today  -  IOP / HRT      2. 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  Nuclear sclerosis OU - monitor  - check AR/MR/BAT  BVCA 8/2013 - 20/50 and 20/25  BATh 8/2013 - 20/400 and 20/50    5. ? APD od - re-check     ASSESSMENT  Clinically significant MGD OU  Failed trials of AT + E-antoni    Plan  POAG OD >> OS  S/P glaucoma tube OD  - baerveldt - [place] - Dr. Mahdi Patrick - 2011  Now has insurance again and is seeing Dr. Joyce  Pt has stopped alphagan P - per Dr. Joyce - he was C/O eye irritation and tearing - intolerant     - if IOP goes up OD  - consider a 2nd GDD in the IN quadrant     - S/P phaco/IOL / baerveldt OS - 10/11/2017  (SN60wf - 21.5)    Still with trace C&F - ?? If mostly pigment cells      Glaucoma gtts -   Timolol OU  Bid - back on it - it was some pill that was causing the dizziness   azopt tid od - change to OU (5/27/2019)   latanoprost q hs od - cont od only - had a prolonged iritis post op     OFF pred acetate     - ?? APD od - difficult to tell - may have a reverse APD   - H/O  narrow angles - too narrow for SLT os - may be more open post phaco    Chronic Blepharitits -- C/O OF Continued TEARING. OS>OD - ongoing issue    WC/LH/EES oint and AT's --> reinforced lid hygiene   BID    RE-START ERYTHROMYCIN OINTMENT TO EYE LIDS QHS/PRN --> pt states he is not using this --> educated to re-start   Start doxycycline 100 mg PO BID x 1 month, then change to daily thereafter      F/U 4 MONTHS  -  DFE - try 24-2 stim V ou (may want to change back to ss in futurte)  / HVF / OCT-RNFL / Surface check

## 2020-08-05 ENCOUNTER — TELEPHONE (OUTPATIENT)
Dept: OPHTHALMOLOGY | Facility: CLINIC | Age: 80
End: 2020-08-05

## 2020-08-05 NOTE — TELEPHONE ENCOUNTER
----- Message from Kamala Dennis sent at 8/5/2020  9:44 AM CDT -----  Contact: Roxane (daughter)  Pt daughter stated pt is having diarrhea due to the antibiotics Dr. Franklin give him. Pt daughter stated she needed to speak with someone in the office. Pt contact number is [phone]

## 2020-12-11 NOTE — PROGRESS NOTES
HPI     DLS: 8/03/20    Pt here for HVF review;  Pt states his eyes keeps tearing up and been having some eye pain.     Meds:  Timolol BID OU   Azopt TID OU   Latanoprost QHS OD   EES antoni QHS OU     1) POAG OD>>OS   2) Blepharitis   3) PAS OD   4) PCIOL OU   5) ? APD OD     Last edited by Asia Wu on 12/14/2020 11:44 AM. (History)              Assessment /Plan     For exam results, see Encounter Report.    Primary open angle glaucoma of both eyes, severe stage    Steroid responder, bilateral    Small pupil    Intraoperative floppy iris syndrome (IFIS)    Iritis of left eye    Pseudophakia of both eyes    Glaucoma shunt device, both eyes    Meibomian gland dysfunction        Old pt of  Dr. Joyce ok  C/O epiphora os - s/p probing and irrigation by [facility] - but still persisit     1. POAG (primary open-angle glaucoma) -Advanced   Treated for glaucoma elsewhere x many years  Pt referred in by Dr. Joyce   S/p Fadia ([place]) OD 2011  Prev SLT OD(?)   First HVF  - [facility] 2013   First photos   [facility]   Treatment / Drops started   Many years ago - elsewhwere           Family history    ?        Glaucoma meds     Timolol ou , latanoprost ou , brimonidine os, dorzolamide os        H/O adverse rxn to glaucoma drops    C/O irritation os with brimonidine and dorzolamide and alphagan P        LASERS    ?        GLAUCOMA SURGERIES    Baerveldt od - still phakic / [place] - Dr. Mahdi Patrikc 2/2011         OTHER EYE SURGERIES    Complex phaco/IOL od 3/18/2015, Phaco/IOL with BV OS 10/11/17        CDR    0.9 w/ inf notch /0.8        Tbase    ??          Tmax    ??            Ttarget    16/16          HVF    1 outside test 6/15/2012 - Dr. Joyce                   2 [facility] test 2013 - 2014 - SALT / IAD od // early SAD os                   5- 2015 to 2020  OD 24-2 stim V dense SALT/IAD // OS 24-s stim V  ss SAD/IAD        Gonio    +3 S/T/N, PAS I od // +3 S/T/N , PAS I os        CCT    512/494        OCT   5 test  2012 to 2020 - RNFL - OD:dec S/I/N // OS:dec S/I/N        HRT    6 test 2013 to 2020 - MR -  MR -  Dec SN/IN/IT border ST od // dec N/SN border ST/T/TI os /// CDR                       0.757 od // 0.788 os       Disc photos    2013, 2016, 2018   - OIS    - Ttoday   14/13  - Test done today  -  IOP / HVF - 24-2 stim V ou // DFE // OCT       2. 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  Nuclear sclerosis OU - monitor  - check AR/MR/BAT  BVCA 8/2013 - 20/50 and 20/25  BATh 8/2013 - 20/400 and 20/50    5. ? APD od - re-check     ASSESSMENT  Clinically significant MGD OU  Failed trials of AT + E-antoni    Plan  POAG OD >> OS  S/P glaucoma tube OD  - baerveldt - [place] - Dr. Mahdi Patrick - 2011  Now has insurance again and is seeing Dr. Joyce  Pt has stopped alphagan P - per Dr. Joyce - he was C/O eye irritation and tearing - intolerant     - if IOP goes up OD  - consider a 2nd GDD in the IN quadrant     - S/P phaco/IOL / baerveldt OS - 10/11/2017  (SN60wf - 21.5)    Still with trace C - ?? If mostly pigment cells      Glaucoma gtts -   Timolol OU  Bid - back on it - it was some pill that was causing the dizziness   azopt tid od - change to OU (5/27/2019)   latanoprost q hs od - cont od only - had a prolonged iritis post op os    OFF pred acetate     - ?? APD od - difficult to tell - may have a reverse APD   - H/O  narrow angles - too narrow for SLT os - may be more open post phaco    Chronic Blepharitits -- C/O OF Continued TEARING. OS>OD - ongoing issue    WC/LH/EES oint and AT's --> reinforced lid hygiene  BID    RE-START ERYTHROMYCIN OINTMENT TO EYE LIDS QHS/PRN --> pt states he is not using this --> educated to re-start   Start doxycycline 100 mg PO BID x 1 month, then change to daily thereafter      F/U 4 MONTHS  -  W/ gonio    - will cont w/ 24-2 stim V  OU going forward

## 2020-12-14 ENCOUNTER — OFFICE VISIT (OUTPATIENT)
Dept: OPHTHALMOLOGY | Facility: CLINIC | Age: 80
End: 2020-12-14
Payer: MEDICARE

## 2020-12-14 ENCOUNTER — CLINICAL SUPPORT (OUTPATIENT)
Dept: OPHTHALMOLOGY | Facility: CLINIC | Age: 80
End: 2020-12-14
Payer: MEDICARE

## 2020-12-14 DIAGNOSIS — H40.043 STEROID RESPONDER, BILATERAL: ICD-10-CM

## 2020-12-14 DIAGNOSIS — H40.1133 PRIMARY OPEN ANGLE GLAUCOMA OF BOTH EYES, SEVERE STAGE: Primary | ICD-10-CM

## 2020-12-14 DIAGNOSIS — H57.03 SMALL PUPIL: ICD-10-CM

## 2020-12-14 DIAGNOSIS — H02.889 MEIBOMIAN GLAND DYSFUNCTION: ICD-10-CM

## 2020-12-14 DIAGNOSIS — Z96.1 PSEUDOPHAKIA OF BOTH EYES: ICD-10-CM

## 2020-12-14 DIAGNOSIS — H20.9 IRITIS OF LEFT EYE: ICD-10-CM

## 2020-12-14 DIAGNOSIS — H21.81 INTRAOPERATIVE FLOPPY IRIS SYNDROME (IFIS): ICD-10-CM

## 2020-12-14 PROCEDURE — 92083 HUMPHREY VISUAL FIELD - OU - BOTH EYES: ICD-10-PCS | Mod: 26,S$PBB,, | Performed by: OPHTHALMOLOGY

## 2020-12-14 PROCEDURE — 99999 PR PBB SHADOW E&M-EST. PATIENT-LVL III: ICD-10-PCS | Mod: PBBFAC,,, | Performed by: OPHTHALMOLOGY

## 2020-12-14 PROCEDURE — 92083 EXTENDED VISUAL FIELD XM: CPT | Mod: PBBFAC | Performed by: OPHTHALMOLOGY

## 2020-12-14 PROCEDURE — 99213 OFFICE O/P EST LOW 20 MIN: CPT | Mod: PBBFAC | Performed by: OPHTHALMOLOGY

## 2020-12-14 PROCEDURE — 99999 PR PBB SHADOW E&M-EST. PATIENT-LVL III: CPT | Mod: PBBFAC,,, | Performed by: OPHTHALMOLOGY

## 2020-12-14 PROCEDURE — 92133 POSTERIOR SEGMENT OCT OPTIC NERVE(OCULAR COHERENCE TOMOGRAPHY) - OU - BOTH EYES: ICD-10-PCS | Mod: 26,S$PBB,, | Performed by: OPHTHALMOLOGY

## 2020-12-14 PROCEDURE — 92014 PR EYE EXAM, EST PATIENT,COMPREHESV: ICD-10-PCS | Mod: S$PBB,,, | Performed by: OPHTHALMOLOGY

## 2020-12-14 PROCEDURE — 92133 CPTRZD OPH DX IMG PST SGM ON: CPT | Mod: PBBFAC | Performed by: OPHTHALMOLOGY

## 2020-12-14 PROCEDURE — 92014 COMPRE OPH EXAM EST PT 1/>: CPT | Mod: S$PBB,,, | Performed by: OPHTHALMOLOGY

## 2020-12-14 RX ORDER — BACLOFEN 10 MG/1
TABLET ORAL
COMMUNITY
Start: 2020-09-15 | End: 2021-10-07

## 2020-12-14 RX ORDER — CARVEDILOL 12.5 MG/1
12.5 TABLET ORAL 2 TIMES DAILY
COMMUNITY
Start: 2020-12-07 | End: 2023-08-15

## 2020-12-14 RX ORDER — POLYETHYLENE GLYCOL 3350 17 G/17G
POWDER, FOR SOLUTION ORAL
COMMUNITY
Start: 2020-09-08 | End: 2021-10-07

## 2020-12-14 NOTE — PROGRESS NOTES
Visual field test done  Pt stated no latex allergies used coverlet  Patch    mrx    [place]  -0/75+ 1.50x 178

## 2020-12-16 ENCOUNTER — LAB VISIT (OUTPATIENT)
Dept: PRIMARY CARE CLINIC | Facility: OTHER | Age: 80
End: 2020-12-16
Attending: INTERNAL MEDICINE
Payer: MEDICARE

## 2020-12-16 DIAGNOSIS — Z03.818 ENCOUNTER FOR OBSERVATION FOR SUSPECTED EXPOSURE TO OTHER BIOLOGICAL AGENTS RULED OUT: Primary | ICD-10-CM

## 2020-12-16 PROCEDURE — U0003 INFECTIOUS AGENT DETECTION BY NUCLEIC ACID (DNA OR RNA); SEVERE ACUTE RESPIRATORY SYNDROME CORONAVIRUS 2 (SARS-COV-2) (CORONAVIRUS DISEASE [COVID-19]), AMPLIFIED PROBE TECHNIQUE, MAKING USE OF HIGH THROUGHPUT TECHNOLOGIES AS DESCRIBED BY CMS-2020-01-R: HCPCS

## 2020-12-18 LAB — SARS-COV-2 RNA RESP QL NAA+PROBE: NOT DETECTED

## 2021-04-19 ENCOUNTER — OFFICE VISIT (OUTPATIENT)
Dept: OPHTHALMOLOGY | Facility: CLINIC | Age: 81
End: 2021-04-19
Payer: MEDICARE

## 2021-04-19 DIAGNOSIS — H40.043 STEROID RESPONDER, BILATERAL: ICD-10-CM

## 2021-04-19 DIAGNOSIS — Z96.1 PSEUDOPHAKIA OF BOTH EYES: ICD-10-CM

## 2021-04-19 DIAGNOSIS — H20.9 IRITIS OF LEFT EYE: ICD-10-CM

## 2021-04-19 DIAGNOSIS — H02.889 MEIBOMIAN GLAND DYSFUNCTION: ICD-10-CM

## 2021-04-19 DIAGNOSIS — H40.1133 PRIMARY OPEN ANGLE GLAUCOMA OF BOTH EYES, SEVERE STAGE: Primary | ICD-10-CM

## 2021-04-19 DIAGNOSIS — H57.03 SMALL PUPIL: ICD-10-CM

## 2021-04-19 DIAGNOSIS — H21.81 INTRAOPERATIVE FLOPPY IRIS SYNDROME (IFIS): ICD-10-CM

## 2021-04-19 PROCEDURE — 99999 PR PBB SHADOW E&M-EST. PATIENT-LVL I: ICD-10-PCS | Mod: PBBFAC,,, | Performed by: OPHTHALMOLOGY

## 2021-04-19 PROCEDURE — 99999 PR PBB SHADOW E&M-EST. PATIENT-LVL I: CPT | Mod: PBBFAC,,, | Performed by: OPHTHALMOLOGY

## 2021-04-19 PROCEDURE — 92020 PR SPECIAL EYE EVAL,GONIOSCOPY: ICD-10-PCS | Mod: S$PBB,,, | Performed by: OPHTHALMOLOGY

## 2021-04-19 PROCEDURE — 92020 GONIOSCOPY: CPT | Mod: PBBFAC | Performed by: OPHTHALMOLOGY

## 2021-04-19 PROCEDURE — 92012 INTRM OPH EXAM EST PATIENT: CPT | Mod: S$PBB,,, | Performed by: OPHTHALMOLOGY

## 2021-04-19 PROCEDURE — 92012 PR EYE EXAM, EST PATIENT,INTERMED: ICD-10-PCS | Mod: S$PBB,,, | Performed by: OPHTHALMOLOGY

## 2021-04-19 PROCEDURE — 99211 OFF/OP EST MAY X REQ PHY/QHP: CPT | Mod: PBBFAC,25 | Performed by: OPHTHALMOLOGY

## 2021-04-19 PROCEDURE — 92020 GONIOSCOPY: CPT | Mod: S$PBB,,, | Performed by: OPHTHALMOLOGY

## 2021-04-19 RX ORDER — TIMOLOL MALEATE 5 MG/ML
1 SOLUTION/ DROPS OPHTHALMIC 2 TIMES DAILY
Qty: 10 ML | Refills: 12 | Status: SHIPPED | OUTPATIENT
Start: 2021-04-19 | End: 2022-06-10 | Stop reason: SDUPTHER

## 2021-04-19 RX ORDER — LATANOPROST 50 UG/ML
1 SOLUTION/ DROPS OPHTHALMIC NIGHTLY
Qty: 2.5 ML | Refills: 12 | Status: SHIPPED | OUTPATIENT
Start: 2021-04-19 | End: 2022-02-04 | Stop reason: SDUPTHER

## 2021-04-19 RX ORDER — BRINZOLAMIDE 10 MG/ML
1 SUSPENSION/ DROPS OPHTHALMIC 3 TIMES DAILY
Qty: 10 ML | Refills: 12 | Status: SHIPPED | OUTPATIENT
Start: 2021-04-19 | End: 2022-05-23

## 2021-07-19 ENCOUNTER — TELEPHONE (OUTPATIENT)
Dept: OPHTHALMOLOGY | Facility: CLINIC | Age: 81
End: 2021-07-19

## 2021-07-28 ENCOUNTER — TELEPHONE (OUTPATIENT)
Dept: OPHTHALMOLOGY | Facility: CLINIC | Age: 81
End: 2021-07-28

## 2021-08-02 ENCOUNTER — TELEPHONE (OUTPATIENT)
Dept: OPHTHALMOLOGY | Facility: CLINIC | Age: 81
End: 2021-08-02

## 2021-10-07 ENCOUNTER — CLINICAL SUPPORT (OUTPATIENT)
Dept: OPHTHALMOLOGY | Facility: CLINIC | Age: 81
End: 2021-10-07
Payer: COMMERCIAL

## 2021-10-07 ENCOUNTER — OFFICE VISIT (OUTPATIENT)
Dept: OPHTHALMOLOGY | Facility: CLINIC | Age: 81
End: 2021-10-07
Payer: MEDICARE

## 2021-10-07 DIAGNOSIS — H20.9 IRITIS OF LEFT EYE: ICD-10-CM

## 2021-10-07 DIAGNOSIS — H02.889 MEIBOMIAN GLAND DYSFUNCTION: ICD-10-CM

## 2021-10-07 DIAGNOSIS — H40.1133 PRIMARY OPEN ANGLE GLAUCOMA OF BOTH EYES, SEVERE STAGE: Primary | ICD-10-CM

## 2021-10-07 DIAGNOSIS — H57.03 SMALL PUPIL: ICD-10-CM

## 2021-10-07 DIAGNOSIS — H21.81 INTRAOPERATIVE FLOPPY IRIS SYNDROME (IFIS): ICD-10-CM

## 2021-10-07 DIAGNOSIS — Z96.1 PSEUDOPHAKIA OF BOTH EYES: ICD-10-CM

## 2021-10-07 DIAGNOSIS — H40.043 STEROID RESPONDER, BILATERAL: ICD-10-CM

## 2021-10-07 PROCEDURE — 92250 FUNDUS PHOTOGRAPHY W/I&R: CPT | Mod: PBBFAC | Performed by: OPHTHALMOLOGY

## 2021-10-07 PROCEDURE — 99999 PR PBB SHADOW E&M-EST. PATIENT-LVL III: CPT | Mod: PBBFAC,,, | Performed by: OPHTHALMOLOGY

## 2021-10-07 PROCEDURE — 92014 PR EYE EXAM, EST PATIENT,COMPREHESV: ICD-10-PCS | Mod: S$PBB,,, | Performed by: OPHTHALMOLOGY

## 2021-10-07 PROCEDURE — 99999 PR PBB SHADOW E&M-EST. PATIENT-LVL III: ICD-10-PCS | Mod: PBBFAC,,, | Performed by: OPHTHALMOLOGY

## 2021-10-07 PROCEDURE — 99213 OFFICE O/P EST LOW 20 MIN: CPT | Mod: PBBFAC | Performed by: OPHTHALMOLOGY

## 2021-10-07 PROCEDURE — 92083 HUMPHREY VISUAL FIELD - OU - BOTH EYES: ICD-10-PCS | Mod: 26,S$PBB,, | Performed by: OPHTHALMOLOGY

## 2021-10-07 PROCEDURE — 92014 COMPRE OPH EXAM EST PT 1/>: CPT | Mod: S$PBB,,, | Performed by: OPHTHALMOLOGY

## 2021-10-07 PROCEDURE — 92083 EXTENDED VISUAL FIELD XM: CPT | Mod: PBBFAC | Performed by: OPHTHALMOLOGY

## 2021-10-07 PROCEDURE — 92250 COLOR FUNDUS PHOTOGRAPHY - OU - BOTH EYES: ICD-10-PCS | Mod: 26,S$PBB,, | Performed by: OPHTHALMOLOGY

## 2021-10-07 RX ORDER — LISINOPRIL 10 MG/1
10 TABLET ORAL 2 TIMES DAILY
COMMUNITY
Start: 2021-08-07

## 2021-10-07 RX ORDER — BACLOFEN 5 MG/1
5 TABLET ORAL DAILY
COMMUNITY
Start: 2021-05-06 | End: 2021-10-07

## 2022-01-11 ENCOUNTER — TELEPHONE (OUTPATIENT)
Dept: OPHTHALMOLOGY | Facility: CLINIC | Age: 82
End: 2022-01-11
Payer: MEDICARE

## 2022-01-11 NOTE — TELEPHONE ENCOUNTER
PA submitted for Azopt via Cover My Meds and was approved.    CaseId:[id number];Status:Approved;Review Type:Prior Auth;Coverage Start Date:01/01/2022;Coverage End Date:01/11/2023;

## 2022-02-02 NOTE — PROGRESS NOTES
HPI     DLS: 10/07/2021    Pt here for 4 Month Check;  Pt states he's been having eye pain and eyes are getting worse with   vision.     Meds;   Timolol BID OU   Azopt TID OU   Latanoprost QHS OD = PT HAS BEEN USING IN BOTH EYES   EES antoni QHS OU     1) POAG OD>>OS   2) Blepharitis   3) PAS OD   4) PCIOL OU   5) ? APD OD     Last edited by Asia Wu on 2/4/2022 11:07 AM. (History)            Assessment /Plan     For exam results, see Encounter Report.    Primary open angle glaucoma of both eyes, severe stage  -     latanoprost 0.005 % ophthalmic solution; Place 1 drop into both eyes every evening.  Dispense: 2.5 mL; Refill: 12    Steroid responder, bilateral    Small pupil    Intraoperative floppy iris syndrome (IFIS)    Meibomian gland dysfunction    Iritis of left eye    Pseudophakia of both eyes        Old pt of  Dr. Joyce ok  C/O epiphora os - s/p probing and irrigation by [facility] - but still persisit     1. POAG (primary open-angle glaucoma) -Advanced   Treated for glaucoma elsewhere x many years  Pt referred in by Dr. Joyce   S/p [place] ([place]) OD 2011  Prev SLT OD(?)   First HVF  - [facility] 2013   First photos   [facility] - 2013   Treatment / Drops started   Many years ago - elsewhwere           Family history    ?        Glaucoma meds     Timolol ou , latanoprost ou , brimonidine os, dorzolamide os        H/O adverse rxn to glaucoma drops    C/O irritation os with brimonidine and dorzolamide and alphagan P        LASERS    ?        GLAUCOMA SURGERIES    Baerveldt od - still phakic / [place] - Dr. Mahdi Patrick 2/2011         OTHER EYE SURGERIES    Complex phaco/IOL od 3/18/2015, Phaco/IOL with BV OS 10/11/17        CDR    0.9 w/ inf notch /0.8        Tbase    ??          Tmax    ??            Ttarget    16/16          HVF    1 outside test 6/15/2012 - Dr. Joyce                   2 [facility] test 2013 - 2014 - SALT / IAD od // early SAD os                   5- 2015 to 2020  OD 24-2 stim V dense  SALT/IAD // OS 24-s stim V  ss SAD/IAD        Gonio    +3 S/T/N, PAS I od // +3 S/T/N , PAS I os        CCT    512/494        OCT   5 test 2012 to 2020 - RNFL - OD:dec S/I/N // OS:dec S/I/N        HRT    6 test 2013 to 2020 - MR -  MR -  Dec SN/IN/IT border ST od // dec N/SN border ST/T/TI os /// CDR                       0.757 od // 0.788 os       Disc photos    2013, 2016, 2018   - OIS // 2021 - [facility]     - Ttoday   `15/15  - Test done today   IOP       2. 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  Nuclear sclerosis OU - monitor  - check AR/MR/BAT  BVCA 8/2013 - 20/50 and 20/25  BATh 8/2013 - 20/400 and 20/50    5. ? APD od - re-check       Plan  POAG OD >> OS  S/P glaucoma tube OD  - baerveldt - [place] - Dr. Mahdi Patrick - 2011  Now has insurance again and is seeing Dr. Joyce  Pt has stopped alphagan P - per Dr. Joyce - he was C/O eye irritation and tearing - intolerant     - if IOP goes up OD  - consider a 2nd GDD in the IN quadrant     - S/P phaco/IOL / baerveldt OS - 10/11/2017  (SN60wf - 21.5)    AC quiet      Glaucoma gtts -   Timolol OU  Bid - back on it - it was some pill that was causing the dizziness   azopt tid od - change to OU (5/27/2019)   latanoprost q hs - has swithced back to using ou (10/7/2021) doing ok no iritis     OFF pred acetate     - ?? APD od - difficult to tell - may have a reverse APD   - H/O  narrow angles - too narrow for SLT os - may be more open post phaco    Chronic Blepharitits -- C/O OF Continued TEARING. OS>OD - ongoing issue    WC/LH/EES oint and AT's --> reinforced lid hygiene  BID    Ok to use the EES ointment at night and can use during day if irritation - but may cause some blurring of vision    rec gel gtts qid os and EES oint at night q hs    Ok to use regular tear drops od - picture/brochure  of drops given to patient     Photo file updated 2/4/2022     pts vision is stable at 20/60  to 20/70 range od and 20/30 to 20/40 range  os (2/2022)     No PCO od // mild membrane in front of IOL os - consider yag to ant capsule/membrane OS       F/U 4 MONTHS  - IOP try AR/MR/BAT - ? yag to cyclitic  - IOL membrane - might help os    - will cont w/ 24-2 stim V  OU going forward

## 2022-02-04 ENCOUNTER — OFFICE VISIT (OUTPATIENT)
Dept: OPHTHALMOLOGY | Facility: CLINIC | Age: 82
End: 2022-02-04
Payer: MEDICARE

## 2022-02-04 DIAGNOSIS — H21.81 INTRAOPERATIVE FLOPPY IRIS SYNDROME (IFIS): ICD-10-CM

## 2022-02-04 DIAGNOSIS — H40.043 STEROID RESPONDER, BILATERAL: ICD-10-CM

## 2022-02-04 DIAGNOSIS — H57.03 SMALL PUPIL: ICD-10-CM

## 2022-02-04 DIAGNOSIS — H40.1133 PRIMARY OPEN ANGLE GLAUCOMA OF BOTH EYES, SEVERE STAGE: Primary | ICD-10-CM

## 2022-02-04 DIAGNOSIS — Z96.1 PSEUDOPHAKIA OF BOTH EYES: ICD-10-CM

## 2022-02-04 DIAGNOSIS — H02.889 MEIBOMIAN GLAND DYSFUNCTION: ICD-10-CM

## 2022-02-04 DIAGNOSIS — H20.9 IRITIS OF LEFT EYE: ICD-10-CM

## 2022-02-04 PROCEDURE — 92012 INTRM OPH EXAM EST PATIENT: CPT | Mod: S$PBB,,, | Performed by: OPHTHALMOLOGY

## 2022-02-04 PROCEDURE — 99999 PR PBB SHADOW E&M-EST. PATIENT-LVL III: ICD-10-PCS | Mod: PBBFAC,,, | Performed by: OPHTHALMOLOGY

## 2022-02-04 PROCEDURE — 99213 OFFICE O/P EST LOW 20 MIN: CPT | Mod: PBBFAC | Performed by: OPHTHALMOLOGY

## 2022-02-04 PROCEDURE — 99999 PR PBB SHADOW E&M-EST. PATIENT-LVL III: CPT | Mod: PBBFAC,,, | Performed by: OPHTHALMOLOGY

## 2022-02-04 PROCEDURE — 92012 PR EYE EXAM, EST PATIENT,INTERMED: ICD-10-PCS | Mod: S$PBB,,, | Performed by: OPHTHALMOLOGY

## 2022-02-04 RX ORDER — LATANOPROST 50 UG/ML
1 SOLUTION/ DROPS OPHTHALMIC NIGHTLY
Qty: 2.5 ML | Refills: 12
Start: 2022-02-04 | End: 2022-05-23

## 2022-06-09 NOTE — PROGRESS NOTES
HPI     DLS: 2/4/2022      Pt here for 4 Month Check  Pt states he's been having eye pain of the  LT eye and blurry vision.     Meds;   Timolol BID OU   Azopt TID OU   Latanoprost QHS OD -- need refills  EES antoni QHS OU     1) POAG OD>>OS   2) Blepharitis   3) PAS OD   4) PCIOL OU   5) ? APD OD     Last edited by Shaina Martin on 6/10/2022 10:56 AM. (History)            Assessment /Plan     For exam results, see Encounter Report.    Primary open angle glaucoma of both eyes, severe stage    Steroid responder, bilateral    Small pupil    Intraoperative floppy iris syndrome (IFIS)    Meibomian gland dysfunction    Iritis of left eye    Pseudophakia of both eyes        Old pt of  Dr. Joyce ok  C/O epiphora os - s/p probing and irrigation by [facility] - but still persisit     1. POAG (primary open-angle glaucoma) -Advanced   Treated for glaucoma elsewhere x many years  Pt referred in by Dr. Joyce   S/p Fadia ([place]) OD 2011  Prev SLT OD(?)   First HVF  - [facility] 2013   First photos   [facility] - 2013   Treatment / Drops started   Many years ago - elsewhwere           Family history    ?        Glaucoma meds     Timolol ou , latanoprost ou , brimonidine os, dorzolamide os        H/O adverse rxn to glaucoma drops    C/O irritation os with brimonidine and dorzolamide and alphagan P        LASERS    ?        GLAUCOMA SURGERIES    Baerveldt od - still phakic / [place] - Dr. Mahdi Patrick 2/2011         OTHER EYE SURGERIES    Complex phaco/IOL od 3/18/2015, Phaco/IOL with BV OS 10/11/17        CDR    0.9 w/ inf notch /0.8        Tbase    ??          Tmax    ??            Ttarget    16/16          HVF    1 outside test 6/15/2012 - Dr. Joyce                   2 [facility] test 2013 - 2014 - SALT / IAD od // early SAD os                   5- 2015 to 2020  OD 24-2 stim V dense SALT/IAD // OS 24-s stim V  ss SAD/IAD        Gonio    +3 S/T/N, PAS I od // +3 S/T/N , PAS I os        CCT    512/494        OCT   5  "test 2012 to 2020 - RNFL - OD:dec S/I/N // OS:dec S/I/N        HRT    6 test 2013 to 2020 - MR -  MR -  Dec SN/IN/IT border ST od // dec N/SN border ST/T/TI os /// CDR                       0.757 od // 0.788 os       Disc photos    2013, 2016, 2018   - OIS // 2021 - [facility]     - Ttoday   `15/16  - Test done today   IOP /      2. 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  Nuclear sclerosis OU - monitor  - check AR/MR/BAT  BVCA 8/2013 - 20/50 and 20/25  BATh 8/2013 - 20/400 and 20/50    5. ? APD od - re-check       Plan  POAG OD >> OS  S/P glaucoma tube OD  - baerveldt - [place] - Dr. Mahdi Patrick - 2011  Now has insurance again and is seeing Dr. Joyce  Pt has stopped alphagan P - per Dr. Joyce - he was C/O eye irritation and tearing - intolerant     - if IOP goes up OD  - consider a 2nd GDD in the IN quadrant     - S/P phaco/IOL / baerveldt OS - 10/11/2017  (SN60wf - 21.5)    AC quiet      Glaucoma gtts -   Timolol OU  Bid - back on it - it was some pill that was causing the dizziness   azopt tid od - change to OU (5/27/2019)   latanoprost q hs - has swithced back to using ou (10/7/2021) doing ok no iritis     OFF pred acetate     - ?? APD od - difficult to tell - may have a reverse APD   - H/O  narrow angles - too narrow for SLT os - may be more open post phaco    Chronic Blepharitits -- C/O OF Continued TEARING. OS>OD - ongoing issue    WC/LH/EES oint and AT's --> reinforced lid hygiene  BID    Ok to use the EES ointment at night and can use during day if irritation - but may cause some blurring of vision    rec gel gtts qid os and EES oint at night q hs    Ok to use regular tear drops od - picture/brochure  of drops given to patient     Photo file updated 6/10/2022    pts vision is stable at 20/60 to 20/70 range od and 20/30 to 20/40 range  os (2/2022)     6/10/2022  Pt is now using "medical marijuana" 0.25 mls by mouth - " sublingual - bid - he feels it has helped him see better - can now see his neighbors address across the street   prescribed by Dr Rc Posada MD - in [place] - dispensed by  [facility] dispens[facility]     No PCO od // mild membrane in front of IOL os - consider yag to ant capsule/membrane OS     F/U 4 MONTHS  -  HVF 24-2 stim V ou // DFE // OCT  - ? yag to cyclitic  - IOL membrane - might help os    - will cont w/ 24-2 stim V  OU going forward

## 2022-06-10 ENCOUNTER — OFFICE VISIT (OUTPATIENT)
Dept: OPHTHALMOLOGY | Facility: CLINIC | Age: 82
End: 2022-06-10
Payer: MEDICARE

## 2022-06-10 DIAGNOSIS — H40.043 STEROID RESPONDER, BILATERAL: ICD-10-CM

## 2022-06-10 DIAGNOSIS — H57.03 SMALL PUPIL: ICD-10-CM

## 2022-06-10 DIAGNOSIS — H40.1133 PRIMARY OPEN ANGLE GLAUCOMA OF BOTH EYES, SEVERE STAGE: Primary | ICD-10-CM

## 2022-06-10 DIAGNOSIS — H21.81 INTRAOPERATIVE FLOPPY IRIS SYNDROME (IFIS): ICD-10-CM

## 2022-06-10 DIAGNOSIS — Z96.1 PSEUDOPHAKIA OF BOTH EYES: ICD-10-CM

## 2022-06-10 DIAGNOSIS — H02.889 MEIBOMIAN GLAND DYSFUNCTION: ICD-10-CM

## 2022-06-10 DIAGNOSIS — H20.9 IRITIS OF LEFT EYE: ICD-10-CM

## 2022-06-10 PROCEDURE — 92012 PR EYE EXAM, EST PATIENT,INTERMED: ICD-10-PCS | Mod: S$PBB,,, | Performed by: OPHTHALMOLOGY

## 2022-06-10 PROCEDURE — 99212 OFFICE O/P EST SF 10 MIN: CPT | Mod: PBBFAC | Performed by: OPHTHALMOLOGY

## 2022-06-10 PROCEDURE — 99999 PR PBB SHADOW E&M-EST. PATIENT-LVL II: CPT | Mod: PBBFAC,,, | Performed by: OPHTHALMOLOGY

## 2022-06-10 PROCEDURE — 92012 INTRM OPH EXAM EST PATIENT: CPT | Mod: S$PBB,,, | Performed by: OPHTHALMOLOGY

## 2022-06-10 PROCEDURE — 99999 PR PBB SHADOW E&M-EST. PATIENT-LVL II: ICD-10-PCS | Mod: PBBFAC,,, | Performed by: OPHTHALMOLOGY

## 2022-06-10 RX ORDER — BRINZOLAMIDE 10 MG/ML
1 SUSPENSION/ DROPS OPHTHALMIC 3 TIMES DAILY
Qty: 10 ML | Refills: 12 | Status: SHIPPED | OUTPATIENT
Start: 2022-06-10 | End: 2023-04-03 | Stop reason: SDUPTHER

## 2022-06-10 RX ORDER — LATANOPROST 50 UG/ML
1 SOLUTION/ DROPS OPHTHALMIC NIGHTLY
Qty: 2.5 ML | Refills: 12 | Status: SHIPPED | OUTPATIENT
Start: 2022-06-10 | End: 2023-05-15 | Stop reason: SDUPTHER

## 2022-06-10 RX ORDER — TIMOLOL MALEATE 5 MG/ML
1 SOLUTION/ DROPS OPHTHALMIC 2 TIMES DAILY
Qty: 10 ML | Refills: 12 | Status: SHIPPED | OUTPATIENT
Start: 2022-06-10 | End: 2023-05-15

## 2023-01-03 ENCOUNTER — OFFICE VISIT (OUTPATIENT)
Dept: OPHTHALMOLOGY | Facility: CLINIC | Age: 83
End: 2023-01-03
Payer: MEDICARE

## 2023-01-03 DIAGNOSIS — H57.03 SMALL PUPIL: ICD-10-CM

## 2023-01-03 DIAGNOSIS — H40.1133 PRIMARY OPEN ANGLE GLAUCOMA OF BOTH EYES, SEVERE STAGE: Primary | ICD-10-CM

## 2023-01-03 DIAGNOSIS — H21.81 INTRAOPERATIVE FLOPPY IRIS SYNDROME (IFIS): ICD-10-CM

## 2023-01-03 DIAGNOSIS — H02.889 MEIBOMIAN GLAND DYSFUNCTION: ICD-10-CM

## 2023-01-03 DIAGNOSIS — H40.043 STEROID RESPONDER, BILATERAL: ICD-10-CM

## 2023-01-03 PROCEDURE — 92012 PR EYE EXAM, EST PATIENT,INTERMED: ICD-10-PCS | Mod: S$PBB,,, | Performed by: OPHTHALMOLOGY

## 2023-01-03 PROCEDURE — 99999 PR PBB SHADOW E&M-EST. PATIENT-LVL III: CPT | Mod: PBBFAC,,, | Performed by: OPHTHALMOLOGY

## 2023-01-03 PROCEDURE — 99999 PR PBB SHADOW E&M-EST. PATIENT-LVL III: ICD-10-PCS | Mod: PBBFAC,,, | Performed by: OPHTHALMOLOGY

## 2023-01-03 PROCEDURE — 92012 INTRM OPH EXAM EST PATIENT: CPT | Mod: S$PBB,,, | Performed by: OPHTHALMOLOGY

## 2023-01-03 PROCEDURE — 99213 OFFICE O/P EST LOW 20 MIN: CPT | Mod: PBBFAC | Performed by: OPHTHALMOLOGY

## 2023-01-03 NOTE — PROGRESS NOTES
HPI    DLS: 6/10/2022    Pt here for Overdue Glaucoma Check;  Pt states vision is getting worse feels like vision is closing up on him.   Pt states at times he will have some discomfort to his OS feels like   inflammation in it and the OS tears up all day long.     Meds;  Timolol BID OU  Azopt TID OU  Latanoprost QHS OU  EES ROQUE QHS OU    1) POAG OD>>OS   2) Blepharitis   3) PAS OD   4) PCIOL OU   5) ? APD OD     Last edited by Asia Wu on 1/3/2023  9:52 AM.            Assessment /Plan     For exam results, see Encounter Report.    Primary open angle glaucoma of both eyes, severe stage  -     Paiz Visual Field - OU - Extended - Both Eyes; Future  -     Posterior Segment OCT Optic Nerve- Both eyes; Future    Steroid responder, bilateral    Small pupil    Intraoperative floppy iris syndrome (IFIS)    Meibomian gland dysfunction        HPI    DLS: 6/10/2022    Pt here for Overdue Glaucoma Check;  Pt states vision is getting worse feels like vision is closing up on him.   Pt states at times he will have some discomfort to his OS feels like   inflammation in it and the OS tears up all day long.     Meds;  Timolol BID OU  Azopt TID OU  Latanoprost QHS OU  EES ROQUE QHS OU    1) POAG OD>>OS   2) Blepharitis   3) PAS OD   4) PCIOL OU   5) ? APD OD     Last edited by Asia Wu on 1/3/2023  9:52 AM.            Assessment /Plan     For exam results, see Encounter Report.    Primary open angle glaucoma of both eyes, severe stage  -     Paiz Visual Field - OU - Extended - Both Eyes; Future  -     Posterior Segment OCT Optic Nerve- Both eyes; Future    Steroid responder, bilateral    Small pupil    Intraoperative floppy iris syndrome (IFIS)    Meibomian gland dysfunction        Old pt of  Dr. Joyce ok  C/O epiphora os - s/p probing and irrigation by [facility] - but still persisit     1. POAG (primary open-angle glaucoma) -Advanced   Treated for glaucoma elsewhere x many years  Pt referred in by Dr. Joyce   S/p  Barveldt ([place]) OD 2011  Prev SLT OD(?)   First HVF  - [facility] 2013   First photos   [facility] - 2013   Treatment / Drops started   Many years ago - elsewhwere           Family history    ?        Glaucoma meds     Timolol ou , latanoprost ou , brimonidine os, dorzolamide os        H/O adverse rxn to glaucoma drops    C/O irritation os with brimonidine and dorzolamide and alphagan P        LASERS    ?        GLAUCOMA SURGERIES    Baerveldt od - still phakic / [place] - Dr. Mahdi Patrick 2/2011         OTHER EYE SURGERIES    Complex phaco/IOL od 3/18/2015, Phaco/IOL with BV OS 10/11/17        CDR    0.9 w/ inf notch /0.8        Tbase    ??          Tmax    ??            Ttarget    16/16          HVF    1 outside test 6/15/2012 - Dr. Joyce                   2 [facility] test 2013 - 2014 - SALT / IAD od // early SAD os                   5- 2015 to 2020  OD 24-2 stim V dense SALT/IAD // OS 24-s stim V  ss SAD/IAD        Gonio    +3 S/T/N, PAS I od // +3 S/T/N , PAS I os        CCT    512/494        OCT   5 test 2012 to 2020 - RNFL - OD:dec S/I/N // OS:dec S/I/N        HRT    6 test 2013 to 2020 - MR -  MR -  Dec SN/IN/IT border ST od // dec N/SN border ST/T/TI os /// CDR                       0.757 od // 0.788 os       Disc photos    2013, 2016, 2018   - OIS // 2021 - [facility]     - Ttoday   `15/16  - Test done today   IOP /      2. 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  Nuclear sclerosis OU - monitor  - check AR/MR/BAT  BVCA 8/2013 - 20/50 and 20/25  BATh 8/2013 - 20/400 and 20/50    5. ? APD od - re-check       Plan  POAG OD >> OS  S/P glaucoma tube OD  - baerveldt - [place] - Dr. Mahdi Patrick - 2011  Now has insurance again and is seeing Dr. Joyce  Pt has stopped alphagan P - per Dr. Joyce - he was C/O eye irritation and tearing - intolerant     - if IOP goes up OD  - consider a 2nd GDD in the IN quadrant     - S/P phaco/IOL /  "mateoerveldt OS - 10/11/2017  (SN60wf - 21.5)    AC quiet      Glaucoma gtts -   Timolol OU  Bid - back on it - it was some pill that was causing the dizziness   azopt tid od - change to OU (5/27/2019)   latanoprost q hs - has swithced back to using ou (10/7/2021) doing ok no iritis     OFF pred acetate     - ?? APD od - difficult to tell - may have a reverse APD   - H/O  narrow angles - too narrow for SLT os - may be more open post phaco    Chronic Blepharitits -- C/O OF Continued TEARING. OS>OD - ongoing issue    WC/LH/EES oint and AT's --> reinforced lid hygiene  BID    Ok to use the EES ointment at night and can use during day if irritation - but may cause some blurring of vision    rec gel gtts qid os and EES oint at night q hs    Ok to use regular tear drops od - picture/brochure  of drops given to patient     Photo file updated 6/10/2022    pts vision is stable at 20/60 to 20/70 range od and 20/30 to 20/40 range  os (2/2022)     6/10/2022  Pt is now using "medical marijuana" 0.25 mls by mouth - sublingual - bid - he feels it has helped him see better - can now see his neighbors address across the street   prescribed by Dr Rc Posada MD - in [place] - dispensed by  [facility] dispens[facility]     No PCO od // mild membrane in front of IOL os - consider yag to ant capsule/membrane OS     Photo file updated 1/3/2023    01/03/2023  - IOP check, excellent 13//14      F/U 3 - 4 MONTHS  -  HVF 24-2 stim V ou // DFE // OCT  - ? yag to cyclitic  - IOL membrane - might help os    - will cont w/ 24-2 stim V  OU going forward                         "

## 2023-03-23 ENCOUNTER — TELEPHONE (OUTPATIENT)
Dept: OPHTHALMOLOGY | Facility: CLINIC | Age: 83
End: 2023-03-23
Payer: COMMERCIAL

## 2023-03-23 NOTE — TELEPHONE ENCOUNTER
Pt's daughter states that pt feels artifical tears are causing him to go blind. They saw on the news that AT's were recalled and pt states AT's are causing him to lose vision. Pt's daughetr advised that it is only a particular brand that was recalled and not all of them. Pt is not having sudden loss of vision and informed that his vision loss is due to his glaucoma.  has discuss this with pt at his appts. Pt's daughter magaña not come with him to appts. Pt's daughter is upset and does not understand what  is doing to prevent pt from going blind. Pt's daughter informed that pt is on glaucoma drops and has had glaucoma sx and I explained to pt's daughter that this is save the vision pt has left and slow the progression of glaucoma. Pt's daughter advised to come in with her father and she can discuss with  if needed.

## 2023-03-23 NOTE — TELEPHONE ENCOUNTER
----- Message from Lucy Aceves sent at 3/23/2023  2:59 PM CDT -----  Regarding: Questions  Pt's daughter is requesting a prescription for an eye wash for pt. Pt might have an infection. The cause could be the artificial tears.      Roxane @ [phone]

## 2023-04-03 DIAGNOSIS — H40.1133 PRIMARY OPEN ANGLE GLAUCOMA OF BOTH EYES, SEVERE STAGE: ICD-10-CM

## 2023-04-03 RX ORDER — BRINZOLAMIDE 10 MG/ML
1 SUSPENSION/ DROPS OPHTHALMIC 3 TIMES DAILY
Qty: 10 ML | Refills: 12 | Status: SHIPPED | OUTPATIENT
Start: 2023-04-03 | End: 2023-04-05 | Stop reason: CLARIF

## 2023-04-05 DIAGNOSIS — H40.1133 PRIMARY OPEN ANGLE GLAUCOMA (POAG) OF BOTH EYES, SEVERE STAGE: Primary | ICD-10-CM

## 2023-04-05 RX ORDER — BRINZOLAMIDE 10 MG/ML
1 SUSPENSION/ DROPS OPHTHALMIC 3 TIMES DAILY
COMMUNITY
Start: 2023-04-06 | End: 2023-04-05 | Stop reason: SDUPTHER

## 2023-04-05 RX ORDER — BRINZOLAMIDE 10 MG/ML
1 SUSPENSION/ DROPS OPHTHALMIC 3 TIMES DAILY
Qty: 10 ML | Refills: 12 | Status: SHIPPED | OUTPATIENT
Start: 2023-04-06 | End: 2023-05-15

## 2023-04-06 ENCOUNTER — TELEPHONE (OUTPATIENT)
Dept: OPHTHALMOLOGY | Facility: CLINIC | Age: 83
End: 2023-04-06
Payer: COMMERCIAL

## 2023-04-06 NOTE — TELEPHONE ENCOUNTER
----- Message from Rae Dickerson sent at 4/6/2023  9:47 AM CDT -----  Regarding: Appeal on medication  Contact: [phone]  [facility] needs a call back to get a prior authorization for brinzolamide (AZOPT) 1 % ophthalmic suspension. Needs a callback today before it expires and pt will get denied the medication     Ref# [id number]

## 2023-04-06 NOTE — TELEPHONE ENCOUNTER
already sent in Rx for pt to get generic Azopt (Brinzolamide) and pt was informed of change in drops. Brinzolamide is covered and pharmacy has already filled script for pt

## 2023-05-04 ENCOUNTER — TELEPHONE (OUTPATIENT)
Dept: OPHTHALMOLOGY | Facility: CLINIC | Age: 83
End: 2023-05-04
Payer: COMMERCIAL

## 2023-05-04 NOTE — TELEPHONE ENCOUNTER
----- Message from Chey Kaiser sent at 5/4/2023  3:50 PM CDT -----  Regarding: advice  Contact: Roxane (daughter) @ [phone]  Pts daughter is calling to see what kind of eye drops Dr Franklin recommended for dry eyes.  Pls call.

## 2023-05-04 NOTE — TELEPHONE ENCOUNTER
"I spoke to pt's daughter and advised her to get artifical tear drops to lubricate the eyes. Pt can use any PTC drops for dry eyes and advised to stay away from Visine or drops that say "get the red out". Pt understood    "

## 2023-05-10 NOTE — PROGRESS NOTES
HPI    DLS: 1/03/2023    Pt here for HVF review/OCT;  Pt states vision is doing bad feels like his vision is closing up on him   and OS keeps tearing up.     Meds:  Timolol BID OU   Azopt TID OU   Latanoprost QHS OU   EES ROQUE QHS OU     1) POAG OD>>OS   2) Blepharitis   3) PAS OD   4) PCIOL OU   5) ? APD OD     Last edited by Asia Wu on 5/15/2023 12:03 PM.            Assessment /Plan     For exam results, see Encounter Report.    Primary open angle glaucoma of both eyes, severe stage    Steroid responder, bilateral    Small pupil    Intraoperative floppy iris syndrome (IFIS)    Meibomian gland dysfunction    Iritis of left eye    Pseudophakia of both eyes    History of glaucoma tube shunt procedure          Old pt of  Dr. Joyce ok  C/O epiphora os - s/p probing and irrigation by Isiah - but still persisit     1. POAG (primary open-angle glaucoma) -Advanced   Treated for glaucoma elsewhere x many years  Pt referred in by Dr. Joyce   S/p Fadia ([place]) OD 2011  Prev SLT OD(?)   First HVF  - [facility] 2013   First photos   [facility]   Treatment / Drops started   Many years ago - elsewhwere           Family history    ?        Glaucoma meds     Timolol ou , latanoprost ou , brimonidine os, dorzolamide os        H/O adverse rxn to glaucoma drops    C/O irritation os with brimonidine and dorzolamide and alphagan P        LASERS    ?        GLAUCOMA SURGERIES    Baerveldt od - still phakic / [place] - Dr. Mahdi Patrick 2/2011         OTHER EYE SURGERIES    Complex phaco/IOL od 3/18/2015, Phaco/IOL with BV OS 10/11/17        CDR    0.9 w/ inf notch /0.8        Tbase    ??          Tmax    ??            Ttarget    16/16          HVF    1 outside test 6/15/2012 - Dr. Joyce                   2 [facility] test 2013 - 2014 - SALT / IAD od // early SAD os                   6 test - 2015 to 2023  OD 24-2 stim V dense SALT/IAD // OS 24-s stim V  ss SAD/IAD        Gonio    +3 S/T/N, PAS I od // +3 S/T/N , PAS I  os        CCT    512/494        OCT   6 test 2012 to 2023 - RNFL - OD:dec S/I/N // OS:dec S/I/N        HRT    6 test 2013 to 2020 - MR -  MR -  Dec SN/IN/IT border ST od // dec N/SN border ST/T/TI os /// CDR                       0.757 od // 0.788 os       Disc photos    2013, 2016, 2018   - OIS // 2021 - [facility]     - Ttoday   14/14  - Test done today   IOP /HVF - 24-2 stim V // DFE // OCT       2. 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  Nuclear sclerosis OU - monitor  - check AR/MR/BAT  BVCA 8/2013 - 20/50 and 20/25  BATh 8/2013 - 20/400 and 20/50    5. ? APD od - re-check       Plan  POAG OD >> OS  S/P glaucoma tube OD  - baerveldt - [place] - Dr. Mahdi Patrick - 2011  Now has insurance again and is seeing Dr. Joyce  Pt has stopped alphagan P - per Dr. Joyce - he was C/O eye irritation and tearing - intolerant     - if IOP goes up OD  - consider a 2nd GDD in the IN quadrant     - S/P phaco/IOL / baerveldt OS - 10/11/2017  (SN60wf - 21.5)    AC quiet      Glaucoma gtts -   Timolol OU  Bid - back on it - it was some pill that was causing the dizziness   azopt tid od - change to OU (5/27/2019)   latanoprost q hs - has swithced back to using ou (10/7/2021) doing ok no iritis     OFF pred acetate     - ?? APD od - difficult to tell - may have a reverse APD   - H/O  narrow angles - too narrow for SLT os - may be more open post phaco    Chronic Blepharitits -- C/O OF Continued TEARING. OS>OD - ongoing issue    WC/LH/EES oint and AT's --> reinforced lid hygiene  BID    Ok to use the EES ointment at night and can use during day if irritation - but may cause some blurring of vision    rec gel gtts qid os and EES oint at night q hs    Ok to use regular tear drops od - picture/brochure  of drops given to patient     Photo file updated 6/10/2022    pts vision is stable at 20/60 to 20/70 range od and 20/30 to 20/40 range  os (2/2022)  "    6/10/2022  Pt is now using "medical marijuana" 0.25 mls by mouth - sublingual - bid - he feels it has helped him see better - can now see his neighbors address across the street   prescribed by Dr Rc Posada MD - in [place] - dispensed by  [facility] [facility]     No PCO od // mild membrane in front of IOL os - consider yag to ant capsule/membrane OS     Photo file updated 5/15/2023    5/15/2023   IOP ok   HVF 24-2 stim V - [place] ou - stable   VA stable     Loose suture os - removed at slit lamp - vigamox - sample given qid x 1 week     Change separate topical SUKHI and timolol to generic cosopt ou bid     Cont latnoropost 1 x day ou       Pt wants  to see if glasses can be improved - but his main problem is advanced glaucoma   F/U 4 months - IOP // review optometry notes // monitor macular drusen os      - will cont w/ 24-2 stim V  OU going forward    "

## 2023-05-15 ENCOUNTER — CLINICAL SUPPORT (OUTPATIENT)
Dept: OPHTHALMOLOGY | Facility: CLINIC | Age: 83
End: 2023-05-15
Payer: MEDICARE

## 2023-05-15 ENCOUNTER — OFFICE VISIT (OUTPATIENT)
Dept: OPHTHALMOLOGY | Facility: CLINIC | Age: 83
End: 2023-05-15
Payer: MEDICARE

## 2023-05-15 DIAGNOSIS — Z96.1 PSEUDOPHAKIA OF BOTH EYES: ICD-10-CM

## 2023-05-15 DIAGNOSIS — H40.1133 PRIMARY OPEN ANGLE GLAUCOMA OF BOTH EYES, SEVERE STAGE: Primary | ICD-10-CM

## 2023-05-15 DIAGNOSIS — H20.9 IRITIS OF LEFT EYE: ICD-10-CM

## 2023-05-15 DIAGNOSIS — H21.81 INTRAOPERATIVE FLOPPY IRIS SYNDROME (IFIS): ICD-10-CM

## 2023-05-15 DIAGNOSIS — H40.043 STEROID RESPONDER, BILATERAL: ICD-10-CM

## 2023-05-15 DIAGNOSIS — Z96.89 HISTORY OF GLAUCOMA TUBE SHUNT PROCEDURE: ICD-10-CM

## 2023-05-15 DIAGNOSIS — H02.889 MEIBOMIAN GLAND DYSFUNCTION: ICD-10-CM

## 2023-05-15 DIAGNOSIS — H57.03 SMALL PUPIL: ICD-10-CM

## 2023-05-15 PROCEDURE — 92083 EXTENDED VISUAL FIELD XM: CPT | Mod: PBBFAC | Performed by: OPHTHALMOLOGY

## 2023-05-15 PROCEDURE — 99213 OFFICE O/P EST LOW 20 MIN: CPT | Mod: PBBFAC | Performed by: OPHTHALMOLOGY

## 2023-05-15 PROCEDURE — 99999 PR PBB SHADOW E&M-EST. PATIENT-LVL III: ICD-10-PCS | Mod: PBBFAC,,, | Performed by: OPHTHALMOLOGY

## 2023-05-15 PROCEDURE — 92133 CPTRZD OPH DX IMG PST SGM ON: CPT | Mod: PBBFAC | Performed by: OPHTHALMOLOGY

## 2023-05-15 PROCEDURE — 92083 HUMPHREY VISUAL FIELD - OU - BOTH EYES: ICD-10-PCS | Mod: 26,S$PBB,, | Performed by: OPHTHALMOLOGY

## 2023-05-15 PROCEDURE — 92133 POSTERIOR SEGMENT OCT OPTIC NERVE(OCULAR COHERENCE TOMOGRAPHY) - OU - BOTH EYES: ICD-10-PCS | Mod: 26,S$PBB,, | Performed by: OPHTHALMOLOGY

## 2023-05-15 PROCEDURE — 99214 PR OFFICE/OUTPT VISIT, EST, LEVL IV, 30-39 MIN: ICD-10-PCS | Mod: S$PBB,,, | Performed by: OPHTHALMOLOGY

## 2023-05-15 PROCEDURE — 99999 PR PBB SHADOW E&M-EST. PATIENT-LVL III: CPT | Mod: PBBFAC,,, | Performed by: OPHTHALMOLOGY

## 2023-05-15 PROCEDURE — 99214 OFFICE O/P EST MOD 30 MIN: CPT | Mod: S$PBB,,, | Performed by: OPHTHALMOLOGY

## 2023-05-15 RX ORDER — DORZOLAMIDE HYDROCHLORIDE AND TIMOLOL MALEATE 20; 5 MG/ML; MG/ML
1 SOLUTION/ DROPS OPHTHALMIC 2 TIMES DAILY
Qty: 10 ML | Refills: 12 | Status: SHIPPED | OUTPATIENT
Start: 2023-05-15

## 2023-05-15 RX ORDER — LATANOPROST 50 UG/ML
1 SOLUTION/ DROPS OPHTHALMIC NIGHTLY
Qty: 2.5 ML | Refills: 12 | Status: SHIPPED | OUTPATIENT
Start: 2023-05-15 | End: 2023-07-17

## 2023-05-15 NOTE — PROGRESS NOTES
Visual field test done.  Patient stated no latex allergies used coverlet      Glasses Prescription     Sphere Cylinder Axis Dist VA Add   Right Ida   20/60 +3.00   Left -1.00 +1.75 178 20/25 +3.00

## 2023-06-15 ENCOUNTER — OFFICE VISIT (OUTPATIENT)
Dept: OPTOMETRY | Facility: CLINIC | Age: 83
End: 2023-06-15
Payer: MEDICARE

## 2023-06-15 DIAGNOSIS — H52.202 MYOPIA WITH ASTIGMATISM AND PRESBYOPIA, LEFT: ICD-10-CM

## 2023-06-15 DIAGNOSIS — H04.123 DRY EYE SYNDROME OF BOTH EYES: ICD-10-CM

## 2023-06-15 DIAGNOSIS — H52.12 MYOPIA WITH ASTIGMATISM AND PRESBYOPIA, LEFT: ICD-10-CM

## 2023-06-15 DIAGNOSIS — H52.221 REGULAR ASTIGMATISM OF RIGHT EYE: ICD-10-CM

## 2023-06-15 DIAGNOSIS — H40.1133 PRIMARY OPEN ANGLE GLAUCOMA (POAG) OF BOTH EYES, SEVERE STAGE: ICD-10-CM

## 2023-06-15 DIAGNOSIS — H53.10 SUBJECTIVE VISUAL DISTURBANCE: Primary | ICD-10-CM

## 2023-06-15 DIAGNOSIS — H52.4 MYOPIA WITH ASTIGMATISM AND PRESBYOPIA, LEFT: ICD-10-CM

## 2023-06-15 PROCEDURE — 99999 PR PBB SHADOW E&M-EST. PATIENT-LVL II: CPT | Mod: PBBFAC,,, | Performed by: OPTOMETRIST

## 2023-06-15 PROCEDURE — 99212 OFFICE O/P EST SF 10 MIN: CPT | Mod: PBBFAC | Performed by: OPTOMETRIST

## 2023-06-15 PROCEDURE — 99999 PR PBB SHADOW E&M-EST. PATIENT-LVL II: ICD-10-PCS | Mod: PBBFAC,,, | Performed by: OPTOMETRIST

## 2023-06-15 PROCEDURE — 92015 DETERMINE REFRACTIVE STATE: CPT | Mod: ,,, | Performed by: OPTOMETRIST

## 2023-06-15 PROCEDURE — 99214 OFFICE O/P EST MOD 30 MIN: CPT | Mod: S$PBB,,, | Performed by: OPTOMETRIST

## 2023-06-15 PROCEDURE — 99214 PR OFFICE/OUTPT VISIT, EST, LEVL IV, 30-39 MIN: ICD-10-PCS | Mod: S$PBB,,, | Performed by: OPTOMETRIST

## 2023-06-15 PROCEDURE — 92015 PR REFRACTION: ICD-10-PCS | Mod: ,,, | Performed by: OPTOMETRIST

## 2023-06-15 NOTE — PROGRESS NOTES
HPI    Annual eye exam  First Time Patient    82 y.o. is here for annual eye exam  Pt states last eye exam was last year  Pt feels vision has change  Pt wear bifocal gl all the time  Pt want to get an rx for gl    (+) Changes in vision   (+) Pain Sharp  (-) Irritation   (+) Itching Tearing  (-) Flashes  (-) Floaters  (+) Glasses wearer  (-) CL wearer  (+) Uses eye gtts    Does patient want a refraction today?     (-) Eye injury  (+) Eye surgery Pt had tubs in ou  (-)POHx  (-)FOHx    (+)DM  No results found for: HGBA1C     Currently on one drop twice a day, 1 eye at night    Artificial tears still using, but not current    Last edited by Mary Lino, OD on 6/15/2023  3:21 PM.        ROS    Negative for: Constitutional, Gastrointestinal, Neurological, Skin,   Genitourinary, Musculoskeletal, HENT, Endocrine, Cardiovascular, Eyes,   Respiratory, Psychiatric, Allergic/Imm, Heme/Lymph  Last edited by Mary Lino, OD on 6/15/2023  3:00 PM.        Assessment /Plan     For exam results, see Encounter Report.    Subjective visual disturbance    Myopia with astigmatism and presbyopia, left    Regular astigmatism of right eye    Primary open angle glaucoma (POAG) of both eyes, severe stage    Dry eye syndrome of both eyes      Dispensed Srx. Educated patient about limitations to correction relative to current stage of glaucoma.  Recommended continued F/U with Dr. Franklin for Glaucoma F/U and management.  Recommended continued use of Ats QID for dry eye symptoms, and use of lid scrubs BID for lid hygiene.    RTC 1 year for CVE. RTC STAT if any sudden changes in vision occur prn.

## 2023-07-17 DIAGNOSIS — H40.1133 PRIMARY OPEN ANGLE GLAUCOMA OF BOTH EYES, SEVERE STAGE: ICD-10-CM

## 2023-07-17 RX ORDER — LATANOPROST 50 UG/ML
SOLUTION/ DROPS OPHTHALMIC
Qty: 2.5 ML | Refills: 12 | Status: SHIPPED | OUTPATIENT
Start: 2023-07-17

## 2023-07-21 ENCOUNTER — TELEPHONE (OUTPATIENT)
Dept: OPHTHALMOLOGY | Facility: CLINIC | Age: 83
End: 2023-07-21
Payer: COMMERCIAL

## 2023-07-21 NOTE — TELEPHONE ENCOUNTER
Left voice message with Ilana to call at the ENT office pt went to due to [place] left for the day. [phone]

## 2023-08-11 NOTE — PROGRESS NOTES
"  HPI     Glaucoma            Comments: 4 month ck and pt feels vision is getting worse          Comments    DLS: 5/15/23  Pt  states he saw ENT doctor for dizziness and they told him they "saw   something in his eyes" and requested he be seen sooner. Pt was told that   drops may be causing dizziness.. Pt also saw  on 6/15/23 for new   glasses but feels they do not help vision and feels vision is getting   worse    1) POAG OD>>OS  2) Blepharitis  3) PAS OD  4) PCIOL OU  5) ? APD OD    MEDS:  Cosopt BID OU  Latanoprost QHS OU  EES antoni QHS OU              Last edited by Sharmin Simons MA on 8/15/2023 11:36 AM.            Assessment /Plan     For exam results, see Encounter Report.    Primary open angle glaucoma of both eyes, severe stage    Steroid responder, bilateral    Small pupil    Intraoperative floppy iris syndrome (IFIS)    Meibomian gland dysfunction    Iritis of left eye    Pseudophakia of both eyes    History of glaucoma tube shunt procedure          Old pt of  Dr. Joyce ok  C/O epiphora os - s/p probing and irrigation by [facility]is[facility] - but still persisit     1. POAG (primary open-angle glaucoma) -Advanced   Treated for glaucoma elsewhere x many years  Pt referred in by Dr. Joyce   S/p Barveldt ([place]) OD 2011  Prev SLT OD(?)   First HVF  - [facility] 2013   First photos   [facility] - 2013   Treatment / Drops started   Many years ago - elsewhwere           Family history    ?        Glaucoma meds     Timolol ou , latanoprost ou , brimonidine os, dorzolamide os        H/O adverse rxn to glaucoma drops    C/O irritation os with brimonidine and dorzolamide and alphagan P        LASERS    ?        GLAUCOMA SURGERIES    Baerveldt od - still phakic / [place] - Dr. Mahdi Patrick 2/2011         OTHER EYE SURGERIES    Complex phaco/IOL od 3/18/2015, Phaco/IOL with BV OS 10/11/17        CDR    0.9 w/ inf notch /0.8        Tbase    ??          Tmax    ??            Ttarget    16/16          HVF    1 outside test " 6/15/2012 - Dr. Joyce                   2 [facility] test 2013 - 2014 - SALT / IAD od // early SAD os                   6 test - 2015 to 2023  OD 24-2 stim V dense SALT/IAD // OS 24-s stim V  ss SAD/IAD        Gonio    +3 S/T/N, PAS I od // +3 S/T/N , PAS I os        CCT    512/494        OCT   6 test 2012 to 2023 - RNFL - OD:dec S/I/N // OS:dec S/I/N        HRT    6 test 2013 to 2020 - MR -  MR -  Dec SN/IN/IT border ST od // dec N/SN border ST/T/TI os /// CDR                       0.757 od // 0.788 os       Disc photos    2013, 2016, 2018   - OIS // 2021 - [facility]     - Ttoday   16/15  - Test done today   IOP /      2. 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  ? APD od    5. PC IOL - os 10/11/2017   PC IOL OD 3/18/2015        Plan  POAG OD >> OS  S/P glaucoma tube OD  - baerveldt - [place] - Dr. Mahdi Patrick - 2011  Now has insurance again and is seeing Dr. Joyce  Pt has stopped alphagan P - per Dr. Joyce - he was C/O eye irritation and tearing - intolerant     - if IOP goes up OD  - consider a 2nd GDD in the IN quadrant     - S/P phaco/IOL / baerveldt OS - 10/11/2017  (SN60wf - 21.5)    AC quiet      Glaucoma gtts -   Timolol OU  Bid - back on it - it was some pill that was causing the dizziness   azopt tid od - change to OU (5/27/2019)   latanoprost q hs - has swithced back to using ou (10/7/2021) doing ok no iritis     OFF pred acetate     - ?? APD od - difficult to tell - may have a reverse APD   - H/O  narrow angles - too narrow for SLT os - may be more open post phaco    Chronic Blepharitits -- C/O OF Continued TEARING. OS>OD - ongoing issue    WC/LH/EES oint and AT's --> reinforced lid hygiene  BID    Ok to use the EES ointment at night and can use during day if irritation - but may cause some blurring of vision    rec gel gtts qid os and EES oint at night q hs    Ok to use regular tear drops od - picture/brochure  of  "drops given to patient     Photo file updated 6/10/2022     pts vision is stable at 20/60 to 20/70 range od and 20/30 to 20/40 range  os (2/2022)     6/10/2022  Pt is now using "medical marijuana" 0.25 mls by mouth - sublingual - bid - he feels it has helped him see better - can now see his neighbors address across the street   prescribed by Dr Rc Posada MD - in [place] - dispensed by  [facility] dispens[facility]     No PCO od // mild membrane in front of IOL os - consider yag to ant capsule/membrane OS     Photo file updated 5/15/2023    5/15/2023   IOP ok   HVF 24-2 stim V - [place] ou - stable   VA stable     Loose suture os - removed at slit lamp - vigamox - sample given qid x 1 week     Change separate topical SUKHI and timolol to generic cosopt ou bid     Cont latnoropost 1 x day ou     Glasses Dr Cortez 6/15/2023 - this is best that can be done   - but his main problem is advanced glaucoma     8/15/2023   Pt is C/O vision closing in and of dizziness  Reviewed past notes   Once before pt felt the timolol was causing dizziness - but after stopping it he figured out that it was not the timolol - but some other pill causing the problems  Offered to stop the timolol and switch him to dorzolamide (or azopt that he use to use) - but he has been on timolol for > 4 years so it seems unlikely that it is the cause of the dizziness. If the timolol is discontinued the IOP is likely to go up . He elects to stay on the cosopt at this time   It may be that the very restricted VF's may be affecting is balance and depth of focus     Pt is on max tolerated glaucoma drops   He has had GDD in both eyes   Cont to monitor HVF 24-2 stim V ou   Consider CB destruction prn     F/U 4 months - IOP //  gonio // monitor macular drusen os      - will cont w/ 24-2 stim V  OU going forward        "

## 2023-08-15 ENCOUNTER — OFFICE VISIT (OUTPATIENT)
Dept: OPHTHALMOLOGY | Facility: CLINIC | Age: 83
End: 2023-08-15
Payer: MEDICARE

## 2023-08-15 DIAGNOSIS — H40.1133 PRIMARY OPEN ANGLE GLAUCOMA OF BOTH EYES, SEVERE STAGE: Primary | ICD-10-CM

## 2023-08-15 DIAGNOSIS — Z96.89 HISTORY OF GLAUCOMA TUBE SHUNT PROCEDURE: ICD-10-CM

## 2023-08-15 DIAGNOSIS — H21.81 INTRAOPERATIVE FLOPPY IRIS SYNDROME (IFIS): ICD-10-CM

## 2023-08-15 DIAGNOSIS — Z96.1 PSEUDOPHAKIA OF BOTH EYES: ICD-10-CM

## 2023-08-15 DIAGNOSIS — H02.889 MEIBOMIAN GLAND DYSFUNCTION: ICD-10-CM

## 2023-08-15 DIAGNOSIS — H40.043 STEROID RESPONDER, BILATERAL: ICD-10-CM

## 2023-08-15 DIAGNOSIS — H57.03 SMALL PUPIL: ICD-10-CM

## 2023-08-15 DIAGNOSIS — H20.9 IRITIS OF LEFT EYE: ICD-10-CM

## 2023-08-15 PROCEDURE — 99999 PR PBB SHADOW E&M-EST. PATIENT-LVL III: CPT | Mod: PBBFAC,,, | Performed by: OPHTHALMOLOGY

## 2023-08-15 PROCEDURE — 99213 OFFICE O/P EST LOW 20 MIN: CPT | Mod: PBBFAC | Performed by: OPHTHALMOLOGY

## 2023-08-15 PROCEDURE — 99214 PR OFFICE/OUTPT VISIT, EST, LEVL IV, 30-39 MIN: ICD-10-PCS | Mod: S$PBB,,, | Performed by: OPHTHALMOLOGY

## 2023-08-15 PROCEDURE — 99214 OFFICE O/P EST MOD 30 MIN: CPT | Mod: S$PBB,,, | Performed by: OPHTHALMOLOGY

## 2023-08-15 PROCEDURE — 99999 PR PBB SHADOW E&M-EST. PATIENT-LVL III: ICD-10-PCS | Mod: PBBFAC,,, | Performed by: OPHTHALMOLOGY

## 2023-08-15 RX ORDER — CARVEDILOL 25 MG/1
25 TABLET ORAL 2 TIMES DAILY
COMMUNITY
Start: 2023-06-13

## 2023-08-15 RX ORDER — MECLIZINE HYDROCHLORIDE 25 MG/1
25 TABLET ORAL DAILY PRN
COMMUNITY
Start: 2023-07-03

## 2023-08-15 RX ORDER — FINASTERIDE 5 MG/1
1 TABLET, FILM COATED ORAL EVERY MORNING
COMMUNITY
Start: 2022-09-30 | End: 2023-09-30

## 2023-08-15 RX ORDER — HYDROCHLOROTHIAZIDE 25 MG/1
25 TABLET ORAL DAILY
COMMUNITY
Start: 2023-07-26

## 2023-08-15 RX ORDER — DUTASTERIDE 0.5 MG/1
0.5 CAPSULE, LIQUID FILLED ORAL DAILY
COMMUNITY
Start: 2023-07-17

## 2023-08-15 RX ORDER — AMOXICILLIN 500 MG
2 CAPSULE ORAL EVERY MORNING
COMMUNITY

## 2023-08-15 RX ORDER — FLUOXETINE 10 MG/1
10 CAPSULE ORAL DAILY
COMMUNITY
Start: 2023-03-21

## 2023-08-15 RX ORDER — ALFUZOSIN HYDROCHLORIDE 10 MG/1
10 TABLET, EXTENDED RELEASE ORAL DAILY
COMMUNITY
Start: 2023-07-17

## 2023-12-07 NOTE — PROGRESS NOTES
HPI    DLS: 8/15/2023    Pt here for 4 Month Check;  Pt states he's eyes aren't doing good. Pt states he got new glasses in   July and was seeing okay out of them but now he can barely see out of the   new glasses.     Meds;  Dorzolamide-Timolol BID OU  Latanoprost QHS OU  EES ROQUE QHS OU    1) POAG OD>>OS   2) Blepharitis   3) PAS OD   4) PCIOL OU   5) ? APD OD     Last edited by Asia Wu on 12/11/2023 11:14 AM.            Assessment /Plan     For exam results, see Encounter Report.    Primary open angle glaucoma of both eyes, severe stage    Steroid responder, bilateral    Small pupil    Intraoperative floppy iris syndrome (IFIS)    Meibomian gland dysfunction    Iritis of left eye    Pseudophakia of both eyes    History of glaucoma tube shunt procedure        Old pt of  Dr. Joyce ok  C/O epiphora os - s/p probing and irrigation by [facility]iah - but still persisit     1. POAG (primary open-angle glaucoma) -Advanced   Treated for glaucoma elsewhere x many years  Pt referred in by Dr. Joyce   S/p [place] ([place]) OD 2011  Prev SLT OD(?)   First HVF  - [facility] 2013   First photos   [facility] - 2013   Treatment / Drops started   Many years ago - elsewhwere           Family history    ?        Glaucoma meds     Timolol ou , latanoprost ou , brimonidine os, dorzolamide os        H/O adverse rxn to glaucoma drops    C/O irritation os with brimonidine and dorzolamide and alphagan P        LASERS    ?        GLAUCOMA SURGERIES    Baerveldt od - still phakic / [place] - Dr. Mahdi Patrick 2/2011         OTHER EYE SURGERIES    Complex phaco/IOL od 3/18/2015, Phaco/IOL with BV OS 10/11/17        CDR    0.9 w/ inf notch /0.8        Tbase    ??          Tmax    ??            Ttarget    16/16          HVF    1 outside test 6/15/2012 - Dr. Joyce                   2 [facility] test 2013 - 2014 - SALT / IAD od // early SAD os                   6 test - 2015 to 2023  OD 24-2 stim V dense SALT/IAD // OS 24-s stim V  ss SAD/IAD     "    Gonio    +3 S/T/N, PAS I od // +3 S/T/N , PAS I os        CCT    512/494        OCT   6 test 2012 to 2023 - RNFL - OD:dec S/I/N // OS:dec S/I/N        HRT    6 test 2013 to 2020 - MR -  MR -  Dec SN/IN/IT border ST od // dec N/SN border ST/T/TI os /// CDR                       0.757 od // 0.788 os       Disc photos    2013, 2016, 2018   - OIS // 2021 - [facility]     - Ttoday   16/16  - Test done today   IOP /gonio       2. 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  ? APD od    5. PC IOL - os 10/11/2017   PC IOL OD 3/18/2015        Plan  POAG OD >> OS  S/P glaucoma tube OD  - baerveldt - [place] - Dr. Mahdi Patrick - 2011  Now has insurance again and is seeing Dr. Joyce  Pt has stopped alphagan P - per Dr. Joyce - he was C/O eye irritation and tearing - intolerant     - if IOP goes up OD  - consider a 2nd GDD in the IN quadrant     - S/P phaco/IOL / baerveldt OS - 10/11/2017  (SN60wf - 21.5)    AC quiet      Glaucoma gtts -   Cosopt ou bid   latanoprost q hs - has swithced back to using ou (10/7/2021) doing ok no iritis     OFF pred acetate     - ?? APD od - difficult to tell - may have a reverse APD   - H/O  narrow angles - too narrow for SLT os - may be more open post phaco    Chronic Blepharitits -- C/O OF Continued TEARING. OS>OD - ongoing issue    WC/LH/EES oint and AT's --> reinforced lid hygiene  BID    Ok to use the EES ointment at night and can use during day if irritation - but may cause some blurring of vision    rec gel gtts qid os and EES oint at night q hs    Ok to use regular tear drops od - picture/brochure  of drops given to patient     Photo file updated 6/10/2022     pts vision is stable at 20/60 to 20/70 range od and 20/30 to 20/40 range  os (2/2022)     6/10/2022  Pt is now using "medical marijuana" 0.25 mls by mouth - sublingual - bid - he feels it has helped him see better - can now see his neighbors address " across the street   prescribed by Dr Rc Posada MD - in [place] - dispensed by  [employer] dispens[facility]     No PCO od // mild membrane in front of IOL os - consider yag to ant capsule/membrane OS     Photo file updated 5/15/2023    5/15/2023   IOP ok   HVF 24-2 stim V - [place] ou - stable   VA stable     Loose suture os - removed at slit lamp - vigamox - sample given qid x 1 week     Change separate topical SUKHI and timolol to generic cosopt ou bid     Cont latnoropost 1 x day ou     Glasses Dr Cortez 6/15/2023 - this is best that can be done   - but his main problem is advanced glaucoma     8/15/2023   Pt is C/O vision closing in and of dizziness  Reviewed past notes   Once before pt felt the timolol was causing dizziness - but after stopping it he figured out that it was not the timolol - but some other pill causing the problems  Offered to stop the timolol and switch him to dorzolamide (or azopt that he use to use) - but he has been on timolol for > 4 years so it seems unlikely that it is the cause of the dizziness. If the timolol is discontinued the IOP is likely to go up . He elects to stay on the cosopt at this time   It may be that the very restricted VF's may be affecting is balance and depth of focus       12/11/2023  Pt is on max tolerated glaucoma drops // IOP 16 ou   He has had GDD in both eyes   Cont to monitor HVF 24-2 stim V ou   Consider CB destruction prn     F/U 4 months - IOP //  HVF 24-2 stim V ou // DFE // OCT      - will cont w/ 24-2 stim V  OU going forward

## 2023-12-11 ENCOUNTER — OFFICE VISIT (OUTPATIENT)
Dept: OPHTHALMOLOGY | Facility: CLINIC | Age: 83
End: 2023-12-11
Payer: MEDICARE

## 2023-12-11 DIAGNOSIS — H40.043 STEROID RESPONDER, BILATERAL: ICD-10-CM

## 2023-12-11 DIAGNOSIS — Z96.1 PSEUDOPHAKIA OF BOTH EYES: ICD-10-CM

## 2023-12-11 DIAGNOSIS — H20.9 IRITIS OF LEFT EYE: ICD-10-CM

## 2023-12-11 DIAGNOSIS — Z96.89 HISTORY OF GLAUCOMA TUBE SHUNT PROCEDURE: ICD-10-CM

## 2023-12-11 DIAGNOSIS — H21.81 INTRAOPERATIVE FLOPPY IRIS SYNDROME (IFIS): ICD-10-CM

## 2023-12-11 DIAGNOSIS — H40.1133 PRIMARY OPEN ANGLE GLAUCOMA OF BOTH EYES, SEVERE STAGE: Primary | ICD-10-CM

## 2023-12-11 DIAGNOSIS — H57.03 SMALL PUPIL: ICD-10-CM

## 2023-12-11 DIAGNOSIS — H02.889 MEIBOMIAN GLAND DYSFUNCTION: ICD-10-CM

## 2023-12-11 PROCEDURE — 99214 PR OFFICE/OUTPT VISIT, EST, LEVL IV, 30-39 MIN: ICD-10-PCS | Mod: S$PBB,,, | Performed by: OPHTHALMOLOGY

## 2023-12-11 PROCEDURE — 99999 PR PBB SHADOW E&M-EST. PATIENT-LVL III: ICD-10-PCS | Mod: PBBFAC,,, | Performed by: OPHTHALMOLOGY

## 2023-12-11 PROCEDURE — 99213 OFFICE O/P EST LOW 20 MIN: CPT | Mod: PBBFAC | Performed by: OPHTHALMOLOGY

## 2023-12-11 PROCEDURE — 99999 PR PBB SHADOW E&M-EST. PATIENT-LVL III: CPT | Mod: PBBFAC,,, | Performed by: OPHTHALMOLOGY

## 2023-12-11 PROCEDURE — 99214 OFFICE O/P EST MOD 30 MIN: CPT | Mod: S$PBB,,, | Performed by: OPHTHALMOLOGY

## 2024-04-12 NOTE — PROGRESS NOTES
HPI     Glaucoma            Comments: HVF and OCT review today           Eye Problem            Comments: Pt c/o irritated OS with tearing and soreness, especially in   the corner of eye          Comments    DLS: 12/11/23    1) POAG OD>>OS  2) Blepharitis  3) PAS OD  4) PCIOL OU  5) ? APD OD    MEDS:  Cosopt BID OU  Latanoprost QHS OU  EES antoni QHS OU              Last edited by Sharmin Simons MA on 4/19/2024 11:13 AM.              Assessment /Plan     For exam results, see Encounter Report.    Primary open angle glaucoma of both eyes, severe stage    Steroid responder, bilateral    Small pupil    Intraoperative floppy iris syndrome (IFIS)    Meibomian gland dysfunction    Iritis of left eye    Pseudophakia of both eyes    History of glaucoma tube shunt procedure        Old pt of  Dr. Joyce ok  C/O epiphora os - s/p probing and irrigation by [facility] - but still persisit     1. POAG (primary open-angle glaucoma) -Advanced   Treated for glaucoma elsewhere x many years  Pt referred in by Dr. Joyce   S/p Fadia ([place]) OD 2011  Prev SLT OD(?)   First HVF  - [facility] 2013   First photos   [facility] - 2013   Treatment / Drops started   Many years ago - elsewhwere           Family history    ?        Glaucoma meds     Timolol ou , latanoprost ou , brimonidine os, dorzolamide os        H/O adverse rxn to glaucoma drops    C/O irritation os with brimonidine and dorzolamide and alphagan P        LASERS    ?        GLAUCOMA SURGERIES    Baerveldt od - still phakic / [place] - Dr. Mahdi Patrick 2/2011         OTHER EYE SURGERIES    Complex phaco/IOL od 3/18/2015, Phaco/IOL with BV OS 10/11/17        CDR    0.9 w/ inf notch /0.8        Tbase    ??          Tmax    ??            Ttarget    16/16          HVF    1 outside test 6/15/2012 - Dr. Joyce                   2 [facility] test 2013 - 2014 - SALT / IAD od // early SAD os                   6 test - 2015 to 2024  OD 24-2 stim V dense SALT/IAD // OS 24-s stim V  ss SAD/IAD     "    Gonio    +3 S/T/N, PAS I od // +3 S/T/N , PAS I os        CCT    512/494        OCT   5 test 2017 to 2024 - RNFL - OD:dec S/I/N // OS:dec S/I/N        HRT    6 test 2013 to 2020 -  -  MR -  Dec SN/IN/IT border ST od // dec N/SN border ST/T/TI os /// CDR                       0.757 od // 0.788 os       Disc photos    2013, 2016, 2018   - OIS // 2021 - [facility]     - Ttoday   16/16  - Test done today   IOP / HVF 24-2 stim V ou // DFE // OCT       2. 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  ? APD od    5. PC IOL - os 10/11/2017   PC IOL OD 3/18/2015        Plan  POAG OD >> OS  S/P glaucoma tube OD  - baerveldt - [place] - Dr. Mahdi Patrick - 2011  Now has insurance again and is seeing Dr. Joyce  Pt has stopped alphagan P - per Dr. Joyce - he was C/O eye irritation and tearing - intolerant     - if IOP goes up OD  - consider a 2nd GDD in the IN quadrant     - S/P phaco/IOL / baerveldt OS - 10/11/2017  (SN60wf - 21.5)    AC quiet      Glaucoma gtts -   Cosopt ou bid   latanoprost q hs - has swithced back to using ou (10/7/2021) doing ok no iritis     OFF pred acetate     - ?? APD od - difficult to tell - may have a reverse APD   - H/O  narrow angles - too narrow for SLT os - may be more open post phaco    Chronic Blepharitits -- C/O OF Continued TEARING. OS>OD - ongoing issue    WC/LH/EES oint and AT's --> reinforced lid hygiene  BID    Ok to use the EES ointment at night and can use during day if irritation - but may cause some blurring of vision    rec gel gtts qid os and EES oint at night q hs    Ok to use regular tear drops od - picture/brochure  of drops given to patient     pts vision is stable at 20/60 to 20/70 range od and 20/30 to 20/40 range  os (2/2022)     6/10/2022  Pt is now using "medical marijuana" 0.25 mls by mouth - sublingual - bid - he feels it has helped him see better - can now see his neighbors address across " the street   prescribed by Dr Rc Posada MD - in [place] - dispensed by  [facility]     No PCO od // mild membrane in front of IOL os - consider yag to ant capsule/membrane OS     Photo file updated 5/15/2023    Glasses Dr Cortez 6/15/2023 - this is best that can be done   - but his main problem is advanced glaucoma     8/15/2023   Pt is C/O vision closing in and of dizziness  Reviewed past notes   Once before pt felt the timolol was causing dizziness - but after stopping it he figured out that it was not the timolol - but some other pill causing the problems  Offered to stop the timolol and switch him to dorzolamide (or azopt that he use to use) - but he has been on timolol for > 4 years so it seems unlikely that it is the cause of the dizziness. If the timolol is discontinued the IOP is likely to go up . He elects to stay on the cosopt at this time   It may be that the very restricted VF's may be affecting is balance and depth of focus       12/11/2023  Pt is on max tolerated glaucoma drops // IOP 16 ou   He has had GDD in both eyes   Cont to monitor HVF 24-2 stim V ou - CHANGE TO A 10-2 STIM V OU   Consider CB destruction prn     4/19/2024   VF AND OCT STABLE FROM 2023 TO 2024   IOP AT 16 OU   ON MAX TOLERATED  MEDS / ?? IF EVER TRIED RHOPRESSA - BUT INTOL TO SEVERAL OTHER GTTS   Csm     Pt wants an antibiotic drop to use OS - he says the eye is irritated and red. I told him that I did not think an antibiotic eye drop would help  - but he is very insistent that he thinks it will . Says the EES ointment does NOT help . Rx for oculfolx - os qid sent in . But encourage him to continue with the EES ointment     F/U 4 months - IOP //  GONIO - consider trail of rhopressa / ? If can tolerate systemic SUKHI / ? Consider CB - micropulse prn / or consider 2nd GDD - infero nasal      - will cont w/ 10-2  stim V  OU going forward

## 2024-04-19 ENCOUNTER — OFFICE VISIT (OUTPATIENT)
Dept: OPHTHALMOLOGY | Facility: CLINIC | Age: 84
End: 2024-04-19
Payer: COMMERCIAL

## 2024-04-19 ENCOUNTER — CLINICAL SUPPORT (OUTPATIENT)
Dept: OPHTHALMOLOGY | Facility: CLINIC | Age: 84
End: 2024-04-19
Payer: COMMERCIAL

## 2024-04-19 DIAGNOSIS — H40.043 STEROID RESPONDER, BILATERAL: ICD-10-CM

## 2024-04-19 DIAGNOSIS — Z96.1 PSEUDOPHAKIA OF BOTH EYES: ICD-10-CM

## 2024-04-19 DIAGNOSIS — H21.81 INTRAOPERATIVE FLOPPY IRIS SYNDROME (IFIS): ICD-10-CM

## 2024-04-19 DIAGNOSIS — H20.9 IRITIS OF LEFT EYE: ICD-10-CM

## 2024-04-19 DIAGNOSIS — H40.1133 PRIMARY OPEN ANGLE GLAUCOMA OF BOTH EYES, SEVERE STAGE: ICD-10-CM

## 2024-04-19 DIAGNOSIS — H57.03 SMALL PUPIL: ICD-10-CM

## 2024-04-19 DIAGNOSIS — H02.889 MEIBOMIAN GLAND DYSFUNCTION: ICD-10-CM

## 2024-04-19 DIAGNOSIS — H40.1133 PRIMARY OPEN ANGLE GLAUCOMA OF BOTH EYES, SEVERE STAGE: Primary | ICD-10-CM

## 2024-04-19 DIAGNOSIS — Z96.89 HISTORY OF GLAUCOMA TUBE SHUNT PROCEDURE: ICD-10-CM

## 2024-04-19 PROCEDURE — 99999 PR PBB SHADOW E&M-EST. PATIENT-LVL III: CPT | Mod: PBBFAC,,, | Performed by: OPHTHALMOLOGY

## 2024-04-19 PROCEDURE — 99213 OFFICE O/P EST LOW 20 MIN: CPT | Mod: PBBFAC | Performed by: OPHTHALMOLOGY

## 2024-04-19 RX ORDER — LATANOPROST 50 UG/ML
1 SOLUTION/ DROPS OPHTHALMIC NIGHTLY
Qty: 2.5 ML | Refills: 12 | Status: SHIPPED | OUTPATIENT
Start: 2024-04-19

## 2024-04-19 RX ORDER — TRAZODONE HYDROCHLORIDE 50 MG/1
25 TABLET ORAL NIGHTLY PRN
COMMUNITY
Start: 2024-04-18

## 2024-04-19 RX ORDER — ERYTHROMYCIN 5 MG/G
OINTMENT OPHTHALMIC NIGHTLY
Qty: 1 EACH | Refills: 12 | Status: SHIPPED | OUTPATIENT
Start: 2024-04-19

## 2024-04-19 RX ORDER — OFLOXACIN 3 MG/ML
1 SOLUTION/ DROPS OPHTHALMIC EVERY 4 HOURS
Qty: 5 ML | Refills: 1 | Status: SHIPPED | OUTPATIENT
Start: 2024-04-19 | End: 2024-04-29

## 2024-04-19 RX ORDER — DORZOLAMIDE HYDROCHLORIDE AND TIMOLOL MALEATE 20; 5 MG/ML; MG/ML
1 SOLUTION/ DROPS OPHTHALMIC 2 TIMES DAILY
Qty: 10 ML | Refills: 12 | Status: SHIPPED | OUTPATIENT
Start: 2024-04-19

## 2024-04-19 NOTE — PROGRESS NOTES
HVF rel/fix coop good OU/chart checked for latex allergy/0 + 0.50 x 140 OD -1.50 + 2.50 x 161 OS - BJ

## 2025-03-26 DIAGNOSIS — H40.1133 PRIMARY OPEN ANGLE GLAUCOMA OF BOTH EYES, SEVERE STAGE: ICD-10-CM

## 2025-03-26 RX ORDER — LATANOPROST 50 UG/ML
1 SOLUTION/ DROPS OPHTHALMIC NIGHTLY
Qty: 2.5 ML | Refills: 12 | Status: SHIPPED | OUTPATIENT
Start: 2025-03-26

## 2025-05-08 DIAGNOSIS — H40.9 ADVANCED STAGE GLAUCOMA: Primary | ICD-10-CM

## 2025-05-12 ENCOUNTER — TELEPHONE (OUTPATIENT)
Dept: OPHTHALMOLOGY | Facility: CLINIC | Age: 85
End: 2025-05-12
Payer: COMMERCIAL

## 2025-05-12 NOTE — TELEPHONE ENCOUNTER
Pt is undecided on what Glaucoma Doctor he wants to see at [facility] and will contact us back.  He is a long time pt of Dr. Franklin.

## 2025-05-20 DIAGNOSIS — H40.1133 PRIMARY OPEN ANGLE GLAUCOMA OF BOTH EYES, SEVERE STAGE: ICD-10-CM

## 2025-05-20 RX ORDER — DORZOLAMIDE HYDROCHLORIDE AND TIMOLOL MALEATE 20; 5 MG/ML; MG/ML
1 SOLUTION/ DROPS OPHTHALMIC 2 TIMES DAILY
Qty: 10 ML | Refills: 12 | Status: SHIPPED | OUTPATIENT
Start: 2025-05-20

## 2025-08-04 ENCOUNTER — TELEPHONE (OUTPATIENT)
Dept: OPHTHALMOLOGY | Facility: CLINIC | Age: 85
End: 2025-08-04
Payer: COMMERCIAL

## 2025-08-04 NOTE — TELEPHONE ENCOUNTER
Contacted pt's daughter- right now this is Dr. Mott'd first available appointment.    Copied from CRM #[id number]. Topic: Appointments - Appointment Access  >> Aug 1, 2025 11:32 AM Gurinder wrote:  Type:  Sooner Apoointment Request    Caller is requesting a sooner appointment.  Caller declined first available appointment listed below.  Caller will not accept being placed on the waitlist and is requesting a message be sent to doctor.  Name of Caller:pt  When is the first available appointment?8/25  Symptoms:glaucoma eval  Would the patient rather a call back or a response via [facility]? call  Best Call Back Number:[phone]  Additional Information: pt states they would like to see if pt can come in sooner for an appt. Pt would like a call back asap to discuss. Thank you  >> Aug 1, 2025 12:32 PM Med Assistant [place] wrote:  Please call pt to advise.  ----- Message -----  From: Gurinder Moses  Sent: 8/1/2025  11:35 AM CDT  To: Tiera GIVENS Staff

## 2025-08-25 ENCOUNTER — OFFICE VISIT (OUTPATIENT)
Dept: OPHTHALMOLOGY | Facility: CLINIC | Age: 85
End: 2025-08-25
Payer: MEDICARE

## 2025-08-25 DIAGNOSIS — Z96.89 HISTORY OF GLAUCOMA TUBE SHUNT PROCEDURE: ICD-10-CM

## 2025-08-25 DIAGNOSIS — Z96.1 PSEUDOPHAKIA OF BOTH EYES: ICD-10-CM

## 2025-08-25 DIAGNOSIS — T85.9XXA DISORDER DUE TO INSERTION OF GLAUCOMA DRAINAGE DEVICE: Primary | ICD-10-CM

## 2025-08-25 DIAGNOSIS — H40.1133 PRIMARY OPEN ANGLE GLAUCOMA (POAG) OF BOTH EYES, SEVERE STAGE: ICD-10-CM

## 2025-08-25 PROBLEM — H25.12 NUCLEAR SCLEROTIC CATARACT OF LEFT EYE: Status: RESOLVED | Noted: 2017-10-11 | Resolved: 2025-08-25

## 2025-08-25 PROCEDURE — 99999 PR PBB SHADOW E&M-EST. PATIENT-LVL III: CPT | Mod: PBBFAC,,, | Performed by: OPHTHALMOLOGY

## 2025-08-25 PROCEDURE — 99213 OFFICE O/P EST LOW 20 MIN: CPT | Mod: PBBFAC | Performed by: OPHTHALMOLOGY

## 2025-08-25 PROCEDURE — 99214 OFFICE O/P EST MOD 30 MIN: CPT | Mod: S$PBB,,, | Performed by: OPHTHALMOLOGY

## 2025-08-25 PROCEDURE — G2211 COMPLEX E/M VISIT ADD ON: HCPCS | Mod: ,,, | Performed by: OPHTHALMOLOGY

## 2025-08-26 ENCOUNTER — TELEPHONE (OUTPATIENT)
Dept: OPHTHALMOLOGY | Facility: CLINIC | Age: 85
End: 2025-08-26
Payer: MEDICARE

## 2025-08-26 DIAGNOSIS — T85.9XXA DISORDER DUE TO INSERTION OF GLAUCOMA DRAINAGE DEVICE: Primary | ICD-10-CM

## (undated) DEVICE — KIT GREY EYE

## (undated) DEVICE — SPONGE WEC CEL SPEARS

## (undated) DEVICE — BLADE EYE

## (undated) DEVICE — SEE MEDLINE ITEM 157131

## (undated) DEVICE — SEE MEDLINE ITEM 146313

## (undated) DEVICE — NDL HYPO A BEVEL 22X1 1/2

## (undated) DEVICE — KNIFE ANGLE 1MM

## (undated) DEVICE — FILTER STRAW

## (undated) DEVICE — SOLUTION BSS PLUS

## (undated) DEVICE — GOWN SURGICAL X-LARGE

## (undated) DEVICE — DRAPE STERI 32 X 50

## (undated) DEVICE — BLADE SURG BVL ANG COAX 2.4MM

## (undated) DEVICE — SHIELD COLLAGEN 12HR CORNEAL

## (undated) DEVICE — DRESSING EYE OVAL LF

## (undated) DEVICE — SOL BETADINE 5%

## (undated) DEVICE — KIT MEROCEL INSTRUMENT WIPE

## (undated) DEVICE — GAUZE SPONGE 4X4 12PLY

## (undated) DEVICE — TUBING VENTED 90IN

## (undated) DEVICE — Device